# Patient Record
Sex: FEMALE | Race: WHITE | NOT HISPANIC OR LATINO | Employment: OTHER | URBAN - METROPOLITAN AREA
[De-identification: names, ages, dates, MRNs, and addresses within clinical notes are randomized per-mention and may not be internally consistent; named-entity substitution may affect disease eponyms.]

---

## 2023-03-24 PROBLEM — R32 URINARY INCONTINENCE: Status: ACTIVE | Noted: 2023-03-24

## 2023-03-24 PROBLEM — G47.33 OSA ON CPAP: Status: ACTIVE | Noted: 2023-03-24

## 2023-03-24 PROBLEM — K21.9 GASTROESOPHAGEAL REFLUX DISEASE WITHOUT ESOPHAGITIS: Status: ACTIVE | Noted: 2023-03-24

## 2023-03-24 PROBLEM — H91.90 HEARING LOSS: Status: ACTIVE | Noted: 2023-03-24

## 2023-03-24 PROBLEM — E55.9 VITAMIN D DEFICIENCY: Status: ACTIVE | Noted: 2023-03-24

## 2023-03-24 PROBLEM — C50.919 BREAST CANCER (MULTI): Status: ACTIVE | Noted: 2023-03-24

## 2023-03-24 PROBLEM — I10 HYPERTENSION: Status: ACTIVE | Noted: 2023-03-24

## 2023-03-24 PROBLEM — E78.5 HYPERLIPIDEMIA: Status: ACTIVE | Noted: 2023-03-24

## 2023-03-24 PROBLEM — H61.23 BILATERAL IMPACTED CERUMEN: Status: ACTIVE | Noted: 2023-03-24

## 2023-03-24 PROBLEM — R09.A2 GLOBUS SENSATION: Status: ACTIVE | Noted: 2023-03-24

## 2023-03-24 PROBLEM — M25.562 LEFT KNEE PAIN: Status: ACTIVE | Noted: 2023-03-24

## 2023-03-24 PROBLEM — I63.9 CVA (CEREBRAL VASCULAR ACCIDENT) (MULTI): Status: ACTIVE | Noted: 2023-03-24

## 2023-03-24 PROBLEM — K59.00 CONSTIPATION: Status: ACTIVE | Noted: 2023-03-24

## 2023-03-24 PROBLEM — R73.01 ELEVATED FASTING BLOOD SUGAR: Status: ACTIVE | Noted: 2023-03-24

## 2023-03-24 PROBLEM — J02.9 PHARYNGITIS: Status: ACTIVE | Noted: 2023-03-24

## 2023-03-24 PROBLEM — J01.10 ACUTE FRONTAL SINUSITIS: Status: ACTIVE | Noted: 2023-03-24

## 2023-03-24 PROBLEM — R09.82 POST-NASAL DRIP: Status: ACTIVE | Noted: 2023-03-24

## 2023-03-24 PROBLEM — F32.A DEPRESSION: Status: ACTIVE | Noted: 2023-03-24

## 2023-03-24 PROBLEM — M54.50 ACUTE LOW BACK PAIN: Status: ACTIVE | Noted: 2023-03-24

## 2023-03-24 PROBLEM — E04.1 THYROID NODULE: Status: ACTIVE | Noted: 2023-03-24
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
## 2023-03-24 PROBLEM — E11.9 DIABETES MELLITUS (MULTI): Status: ACTIVE | Noted: 2023-03-24

## 2023-03-24 PROBLEM — Z95.0 PACEMAKER: Status: ACTIVE | Noted: 2023-03-24

## 2023-03-24 PROBLEM — R13.14 PHARYNGOESOPHAGEAL DYSPHAGIA: Status: ACTIVE | Noted: 2023-03-24

## 2023-03-24 PROBLEM — R42 VERTIGO: Status: ACTIVE | Noted: 2023-03-24

## 2023-03-24 PROBLEM — R59.0 CERVICAL ADENOPATHY: Status: ACTIVE | Noted: 2023-03-24

## 2023-03-24 PROBLEM — I95.9 HYPOTENSION: Status: ACTIVE | Noted: 2023-03-24

## 2023-03-24 PROBLEM — M46.20 OSTEOMYELITIS OF SPINE (MULTI): Status: ACTIVE | Noted: 2023-03-24

## 2023-04-03 ENCOUNTER — TELEMEDICINE (OUTPATIENT)
Dept: PHARMACY | Facility: HOSPITAL | Age: 68
End: 2023-04-03
Payer: MEDICARE

## 2023-04-03 DIAGNOSIS — Z79.4 TYPE 2 DIABETES MELLITUS WITHOUT COMPLICATION, WITH LONG-TERM CURRENT USE OF INSULIN (MULTI): Primary | ICD-10-CM

## 2023-04-03 DIAGNOSIS — E11.9 TYPE 2 DIABETES MELLITUS WITHOUT COMPLICATION, WITH LONG-TERM CURRENT USE OF INSULIN (MULTI): Primary | ICD-10-CM

## 2023-04-03 DIAGNOSIS — F32.A DEPRESSION, UNSPECIFIED DEPRESSION TYPE: ICD-10-CM

## 2023-04-03 DIAGNOSIS — Z79.4 TYPE 2 DIABETES MELLITUS WITHOUT COMPLICATION, WITH LONG-TERM CURRENT USE OF INSULIN (MULTI): ICD-10-CM

## 2023-04-03 DIAGNOSIS — E11.9 TYPE 2 DIABETES MELLITUS WITHOUT COMPLICATION, WITH LONG-TERM CURRENT USE OF INSULIN (MULTI): ICD-10-CM

## 2023-04-03 DIAGNOSIS — I10 HYPERTENSION, UNSPECIFIED TYPE: Primary | ICD-10-CM

## 2023-04-03 RX ORDER — BUPROPION HYDROCHLORIDE 450 MG/1
450 TABLET, FILM COATED, EXTENDED RELEASE ORAL EVERY MORNING
Qty: 90 TABLET | Refills: 0
Start: 2023-04-03 | End: 2023-04-12 | Stop reason: SDUPTHER

## 2023-04-03 RX ORDER — AMLODIPINE BESYLATE 5 MG/1
5 TABLET ORAL DAILY
Qty: 90 TABLET | Refills: 0 | Status: SHIPPED | OUTPATIENT
Start: 2023-04-03 | End: 2023-06-29

## 2023-04-03 RX ORDER — ATORVASTATIN CALCIUM 40 MG/1
40 TABLET, FILM COATED ORAL NIGHTLY
Qty: 90 TABLET | Refills: 1 | Status: SHIPPED | OUTPATIENT
Start: 2023-04-03 | End: 2023-08-25 | Stop reason: SDUPTHER

## 2023-04-03 ASSESSMENT — ENCOUNTER SYMPTOMS
CONFUSION: 1
DEPRESSION: 1

## 2023-04-03 NOTE — PROGRESS NOTES
Subjective   Patient ID: Glenna Delgado is a 67 y.o. female who presents for Depression and Diabetes.    Referring Provider: Marleen Llanes,      Depression  Visit Type: follow-up  Patient presents with the following symptoms: confusion.   Severity: moderate   Compliance with medications: unsure compliance, pt mentions needing refill on 300 mg bottle despite last visit pt saying she was taking 450 mg daily with bottle in hand.    Diabetes  She presents for her follow-up diabetic visit. She has type 2 diabetes mellitus. Her disease course has been fluctuating. Hypoglycemia symptoms include confusion. Hypoglycemia complications include nocturnal hypoglycemia. Risk factors for coronary artery disease include dyslipidemia, diabetes mellitus, hypertension, stress and post-menopausal. Current diabetic treatments: Lantus 23 units daily + Humalog 12 units qAM + 6 units qPM, jardiance 25 mg daily, metformin 2g/day+ She is compliant with treatment most of the time. Her home blood glucose trend is fluctuating minimally (wakes up with BG in the 60s-70s at time). An ACE inhibitor/angiotensin II receptor blocker is being taken.       Review of Systems   Psychiatric/Behavioral:  Positive for confusion and depression.      Patient referred to clinical pharmacy for assistance with diabetes management. A1c continues to remain within goal range evidence by result of 5.5% on 12/1/22. Over last summer the patient reported getting hypoglycemia frequently in the 60s because of improper insulin administration, taking basal Lantus twice daily. She was then instructed to change basal insulin to once daily. She has continued to take Lantus 23u qAM + Humalog 12u qAM + 6u qPM. She mentions FBG today is still in the early morning 60-70, previously was . She acknowledges today she needs refills on amlodipine 5 mg daily, bupropion and atorvastatin 40 mg daily. She is asking about the bupropion 300 mg refill when she reported at last visit  taking 450 mg daily.     Objective     There were no vitals taken for this visit.     Labs  Lab Results   Component Value Date    BILITOT 0.3 12/02/2021    CALCIUM 9.8 12/02/2021    CO2 27 12/02/2021     12/02/2021    CREATININE 0.91 12/02/2021    GLUCOSE 183 (H) 12/02/2021    ALKPHOS 129 12/02/2021    K 4.4 12/02/2021    PROT 6.8 12/02/2021     12/02/2021    AST 10 12/02/2021    ALT 11 12/02/2021    BUN 19 12/02/2021    ANIONGAP 14 12/02/2021    ALBUMIN 4.0 12/02/2021     Lab Results   Component Value Date    TRIG 205 (H) 12/02/2021    CHOL 124 12/02/2021    HDL 35.4 (A) 12/02/2021     Lab Results   Component Value Date    HGBA1C 6.7 (A) 12/02/2021     Current Outpatient Medications on File Prior to Visit   Medication Sig Dispense Refill    amLODIPine (Norvasc) 5 mg tablet Take 1 tablet (5 mg) by mouth once daily.      atorvastatin (Lipitor) 40 mg tablet Take 1 tablet (40 mg) by mouth once daily at bedtime.      buPROPion XL (Wellbutrin XL) 300 mg 24 hr tablet TAKE ONE TABLET BY MOUTH EVERY DAY AS DIRECTED (Patient taking differently: 1 tablet (300 mg).) 90 tablet 0    clopidogrel (Plavix) 75 mg tablet TAKE 1 TABLET BY MOUTH EVERY DAY 90 tablet 0    clopidogrel (Plavix) 75 mg tablet Take 1 tablet (75 mg) by mouth once daily.      DULoxetine (Cymbalta) 60 mg DR capsule Take 1 capsule (60 mg) by mouth once daily.      glucose 4 gram chewable tablet Chew 4 tablets (16 g) if needed.      insulin lispro (HumaLOG) 100 unit/mL injection INJECT 12 UNITS SUBCUTANEOUSLY DAILY WITH BREAKFAST AND 9 UNITS DAILY WITH DINNER      Jardiance 25 mg Take 1 tablet (25 mg) by mouth once daily.      Lantus Solostar U-100 Insulin 100 unit/mL (3 mL) pen Inject 28 Units under the skin once daily at bedtime.      lisinopril 40 mg tablet Take 1 tablet (40 mg) by mouth once daily. as directed      metFORMIN (Glucophage) 500 mg tablet Take 2 tablets (1,000 mg) by mouth in the morning and 2 tablets (1,000 mg) in the evening. Take  with meals.      OneTouch Delica Plus Lancet 33 gauge misc USE AS DIRECTED TO TEST BLOOD SUGARS UP TO 5 TIMES DAILY      OneTouch Verio test strips strip USE TO CHECK BLOOD SUGER UP TO 5 TIMES DAILY AS DIRECTED      oxybutynin XL (Ditropan-XL) 10 mg 24 hr tablet Take 1 tablet (10 mg) by mouth once daily at bedtime.       No current facility-administered medications on file prior to visit.         Assessment/Plan   Problem List Items Addressed This Visit          Endocrine/Metabolic    Diabetes mellitus (CMS/HCC)       Other    Depression     Instructed patient to contact  pharmacy to refill correct 450 mg daily bupropion XL  Decrease Lantus to 20 units once daily   Refills for atorvastatin 40 mg and amlodipine 5 mg daily sent to   Continue all other meds as prescribed  Pharm follow up in 4 weeks    Thanks,  Melvin Flores, PharmD

## 2023-04-12 ENCOUNTER — TELEPHONE (OUTPATIENT)
Dept: PHARMACY | Facility: HOSPITAL | Age: 68
End: 2023-04-12
Payer: MEDICARE

## 2023-04-12 DIAGNOSIS — Z79.4 TYPE 2 DIABETES MELLITUS WITHOUT COMPLICATION, WITH LONG-TERM CURRENT USE OF INSULIN (MULTI): Primary | ICD-10-CM

## 2023-04-12 DIAGNOSIS — E11.9 TYPE 2 DIABETES MELLITUS WITHOUT COMPLICATION, WITH LONG-TERM CURRENT USE OF INSULIN (MULTI): Primary | ICD-10-CM

## 2023-04-12 DIAGNOSIS — F32.A DEPRESSION, UNSPECIFIED DEPRESSION TYPE: ICD-10-CM

## 2023-04-12 RX ORDER — BUPROPION HYDROCHLORIDE 450 MG/1
450 TABLET, FILM COATED, EXTENDED RELEASE ORAL EVERY MORNING
Qty: 90 TABLET | Refills: 0 | Status: SHIPPED | OUTPATIENT
Start: 2023-04-12 | End: 2023-06-02

## 2023-04-12 RX ORDER — LISINOPRIL 40 MG/1
40 TABLET ORAL DAILY
Qty: 90 TABLET | Refills: 0 | Status: SHIPPED | OUTPATIENT
Start: 2023-04-12 | End: 2023-07-05 | Stop reason: SDUPTHER

## 2023-05-02 NOTE — PROGRESS NOTES
Patient left voicemail to request refill for oxybutynin. Called patient back to request she comes into the office to see PCP for appointment for any further refills.    Thanks,  Melvin Flores, PharmD

## 2023-05-19 ENCOUNTER — TELEPHONE (OUTPATIENT)
Dept: PRIMARY CARE | Facility: CLINIC | Age: 68
End: 2023-05-19
Payer: MEDICARE

## 2023-05-19 NOTE — TELEPHONE ENCOUNTER
Pt requesting a refill of the following medications:    1). One Touch Delica Plus Lancet 30 ian (per pt 30 ian)      Katie Barba # [phone]

## 2023-05-23 DIAGNOSIS — F32.9 REACTIVE DEPRESSION: Primary | ICD-10-CM

## 2023-05-23 RX ORDER — DULOXETIN HYDROCHLORIDE 60 MG/1
CAPSULE, DELAYED RELEASE ORAL
Qty: 90 CAPSULE | Refills: 0 | Status: SHIPPED | OUTPATIENT
Start: 2023-05-23 | End: 2023-08-25 | Stop reason: SDUPTHER

## 2023-05-25 RX ORDER — LANCETS 33 GAUGE
EACH MISCELLANEOUS
Qty: 100 EACH | Refills: 3 | Status: CANCELLED | OUTPATIENT
Start: 2023-05-25

## 2023-05-26 ENCOUNTER — APPOINTMENT (OUTPATIENT)
Dept: PRIMARY CARE | Facility: CLINIC | Age: 68
End: 2023-05-26
Payer: MEDICARE

## 2023-06-02 ENCOUNTER — OFFICE VISIT (OUTPATIENT)
Dept: PRIMARY CARE | Facility: CLINIC | Age: 68
End: 2023-06-02
Payer: MEDICARE

## 2023-06-02 ENCOUNTER — TELEPHONE (OUTPATIENT)
Dept: PHARMACY | Facility: HOSPITAL | Age: 68
End: 2023-06-02

## 2023-06-02 VITALS — DIASTOLIC BLOOD PRESSURE: 70 MMHG | HEART RATE: 97 BPM | SYSTOLIC BLOOD PRESSURE: 118 MMHG

## 2023-06-02 DIAGNOSIS — Z79.4 TYPE 2 DIABETES MELLITUS WITH OTHER CIRCULATORY COMPLICATION, WITH LONG-TERM CURRENT USE OF INSULIN (MULTI): ICD-10-CM

## 2023-06-02 DIAGNOSIS — I10 PRIMARY HYPERTENSION: Primary | ICD-10-CM

## 2023-06-02 DIAGNOSIS — I63.9 CEREBROVASCULAR ACCIDENT (CVA), UNSPECIFIED MECHANISM (MULTI): ICD-10-CM

## 2023-06-02 DIAGNOSIS — E11.59 TYPE 2 DIABETES MELLITUS WITH OTHER CIRCULATORY COMPLICATION, WITH LONG-TERM CURRENT USE OF INSULIN (MULTI): ICD-10-CM

## 2023-06-02 DIAGNOSIS — F32.A DEPRESSION, UNSPECIFIED DEPRESSION TYPE: Primary | ICD-10-CM

## 2023-06-02 PROBLEM — I47.29 NSVT (NONSUSTAINED VENTRICULAR TACHYCARDIA) (MULTI): Status: ACTIVE | Noted: 2021-01-06

## 2023-06-02 PROBLEM — I44.2 COMPLETE HEART BLOCK (MULTI): Status: ACTIVE | Noted: 2021-01-06

## 2023-06-02 PROBLEM — Z96.651 HISTORY OF TOTAL RIGHT KNEE REPLACEMENT: Status: ACTIVE | Noted: 2020-10-05

## 2023-06-02 PROBLEM — E66.9 OBESITY, CLASS II, BMI 35-39.9: Status: ACTIVE | Noted: 2020-10-05

## 2023-06-02 PROBLEM — Z86.73 HISTORY OF STROKE: Status: ACTIVE | Noted: 2023-06-02

## 2023-06-02 PROBLEM — Z85.3 HISTORY OF BREAST CANCER: Status: ACTIVE | Noted: 2023-06-02

## 2023-06-02 PROBLEM — E66.812 OBESITY, CLASS II, BMI 35-39.9: Status: ACTIVE | Noted: 2020-10-05

## 2023-06-02 PROBLEM — Z95.0 S/P PLACEMENT OF CARDIAC PACEMAKER: Status: ACTIVE | Noted: 2020-10-06

## 2023-06-02 PROBLEM — F40.243 FEAR OF FLYING: Status: ACTIVE | Noted: 2023-06-02

## 2023-06-02 PROBLEM — F41.9 ANXIETY: Status: ACTIVE | Noted: 2023-06-02

## 2023-06-02 PROBLEM — Z86.018 HISTORY OF UTERINE FIBROID: Status: ACTIVE | Noted: 2023-06-02

## 2023-06-02 PROCEDURE — 3074F SYST BP LT 130 MM HG: CPT | Performed by: FAMILY MEDICINE

## 2023-06-02 PROCEDURE — 1036F TOBACCO NON-USER: CPT | Performed by: FAMILY MEDICINE

## 2023-06-02 PROCEDURE — 3078F DIAST BP <80 MM HG: CPT | Performed by: FAMILY MEDICINE

## 2023-06-02 PROCEDURE — 1159F MED LIST DOCD IN RCRD: CPT | Performed by: FAMILY MEDICINE

## 2023-06-02 PROCEDURE — 99214 OFFICE O/P EST MOD 30 MIN: CPT | Performed by: FAMILY MEDICINE

## 2023-06-02 PROCEDURE — 4010F ACE/ARB THERAPY RXD/TAKEN: CPT | Performed by: FAMILY MEDICINE

## 2023-06-02 RX ORDER — BUPROPION HYDROCHLORIDE 150 MG/1
150 TABLET, EXTENDED RELEASE ORAL DAILY
Qty: 30 TABLET | Refills: 5 | Status: SHIPPED | OUTPATIENT
Start: 2023-06-02 | End: 2023-11-29

## 2023-06-02 RX ORDER — PEN NEEDLE, DIABETIC 32GX 5/32"
NEEDLE, DISPOSABLE MISCELLANEOUS
COMMUNITY
Start: 2023-03-20 | End: 2024-01-17 | Stop reason: SDUPTHER

## 2023-06-02 RX ORDER — BUPROPION HYDROCHLORIDE 300 MG/1
300 TABLET ORAL EVERY MORNING
Qty: 30 TABLET | Refills: 5 | Status: SHIPPED | OUTPATIENT
Start: 2023-06-02 | End: 2023-08-11 | Stop reason: SDUPTHER

## 2023-06-02 RX ORDER — METOCLOPRAMIDE 5 MG/1
5 TABLET ORAL 4 TIMES DAILY
COMMUNITY
End: 2023-07-18

## 2023-06-02 ASSESSMENT — ENCOUNTER SYMPTOMS
FATIGUE: 0
ACTIVITY CHANGE: 0
FEVER: 0
COLOR CHANGE: 0
ARTHRALGIAS: 0
BACK PAIN: 0
COUGH: 0
APPETITE CHANGE: 0
FACIAL ASYMMETRY: 0
HEADACHES: 0
LIGHT-HEADEDNESS: 0
PALPITATIONS: 0
DIZZINESS: 0
CHOKING: 0
CHEST TIGHTNESS: 0

## 2023-06-02 NOTE — TELEPHONE ENCOUNTER
I reviewed the progress note and agree with the resident’s findings and plans as written. Case discussed with resident.    Melvin Flores, PharmD

## 2023-06-02 NOTE — PROGRESS NOTES
Subjective   Patient ID: Glenna Delgado is a 67 y.o. female who presents for Med Refill.    Med Refill  Pertinent negatives include no arthralgias, chest pain, congestion, coughing, fatigue, fever or headaches.      Patient comes in for refills of her diabetic medications.  She is currently following with pharmacy and doing quite well.  Denies any chest pain shortness of breath syncopal episodes or palpitations.    Review of Systems   Constitutional:  Negative for activity change, appetite change, fatigue and fever.   HENT:  Negative for congestion.    Respiratory:  Negative for cough, choking and chest tightness.    Cardiovascular:  Negative for chest pain, palpitations and leg swelling.   Musculoskeletal:  Negative for arthralgias, back pain and gait problem.   Skin:  Negative for color change and pallor.   Neurological:  Negative for dizziness, facial asymmetry, light-headedness and headaches.       Objective   /70 (BP Location: Left arm)   Pulse 97   BSA There is no height or weight on file to calculate BSA.      Physical Exam  Constitutional:       General: She is not in acute distress.     Appearance: Normal appearance. She is not toxic-appearing.   HENT:      Head: Normocephalic.      Right Ear: Tympanic membrane, ear canal and external ear normal.      Left Ear: Tympanic membrane, ear canal and external ear normal.      Nose: Nose normal.      Mouth/Throat:      Pharynx: Oropharynx is clear.   Eyes:      Conjunctiva/sclera: Conjunctivae normal.      Pupils: Pupils are equal, round, and reactive to light.   Cardiovascular:      Rate and Rhythm: Normal rate and regular rhythm.      Pulses: Normal pulses.      Heart sounds: Normal heart sounds.   Pulmonary:      Effort: No respiratory distress.      Breath sounds: No wheezing, rhonchi or rales.   Abdominal:      General: Bowel sounds are normal. There is no distension.      Palpations: Abdomen is soft.      Tenderness: There is no abdominal tenderness.    Musculoskeletal:         General: No swelling or tenderness.      Cervical back: No tenderness.   Skin:     Findings: No lesion or rash.   Neurological:      General: No focal deficit present.      Mental Status: She is alert and oriented to person, place, and time. Mental status is at baseline.      Gait: Gait normal.   Psychiatric:         Mood and Affect: Mood normal.         Behavior: Behavior normal.         Thought Content: Thought content normal.         Judgment: Judgment normal.       No visits with results within 1 Year(s) from this visit.   Latest known visit with results is:   Legacy Encounter on 12/02/2021   Component Date Value Ref Range Status    Glucose 12/02/2021 183 (H)  74 - 99 mg/dL Final    Sodium 12/02/2021 138  136 - 145 mmol/L Final    Potassium 12/02/2021 4.4  3.5 - 5.3 mmol/L Final    Chloride 12/02/2021 101  98 - 107 mmol/L Final    Bicarbonate 12/02/2021 27  21 - 32 mmol/L Final    Anion Gap 12/02/2021 14  10 - 20 mmol/L Final    Urea Nitrogen 12/02/2021 19  6 - 23 mg/dL Final    Creatinine 12/02/2021 0.91  0.50 - 1.05 mg/dL Final    GLOMERULAR FILTRATION RATE-NON AFR* 12/02/2021 >60  >60 mL/min/1.73m2 Final    GLOMERULAR FILTRATION RATE-* 12/02/2021 >60  >60 mL/min/1.73m2 Final    Comment:  CALCULATIONS OF ESTIMATED GFR ARE PERFORMED   USING THE MDRD STUDY EQUATION FOR THE   IDMS-TRACEABLE CREATININE METHODS.   CLIN CHEM 2007;53:766-72      Calcium 12/02/2021 9.8  8.6 - 10.6 mg/dL Final    Albumin 12/02/2021 4.0  3.4 - 5.0 g/dL Final    Alkaline Phosphatase 12/02/2021 129  33 - 136 U/L Final    Total Protein 12/02/2021 6.8  6.4 - 8.2 g/dL Final    AST 12/02/2021 10  9 - 39 U/L Final    Total Bilirubin 12/02/2021 0.3  0.0 - 1.2 mg/dL Final    ALT (SGPT) 12/02/2021 11  7 - 45 U/L Final    Comment:  Patients treated with Sulfasalazine may generate    falsely decreased results for ALT.      Cholesterol 12/02/2021 124  0 - 199 mg/dL Final    Comment: .      AGE      DESIRABLE    BORDERLINE HIGH   HIGH     0-19 Y     0 - 169       170 - 199     >/= 200    20-24 Y     0 - 189       190 - 224     >/= 225         >24 Y     0 - 199       200 - 239     >/= 240   **All ranges are based on fasting samples. Specific   therapeutic targets will vary based on patient-specific   cardiac risk.  .   Pediatric guidelines reference:Pediatrics 2011, 128(S5).   Adult guidelines reference: NCEP ATPIII Guidelines,     KAREEM 2001, 258:2486-97  .   Venipuncture immediately after or during the    administration of Metamizole may lead to falsely   low results. Testing should be performed immediately   prior to Metamizole dosing.      HDL 12/02/2021 35.4 (A)  mg/dL Final    Comment: .      AGE      VERY LOW   LOW     NORMAL    HIGH       0-19 Y       < 35   < 40     40-45     ----    20-24 Y       ----   < 40       >45     ----      >24 Y       ----   < 40     40-60      >60  .      Cholesterol/HDL Ratio 12/02/2021 3.5   Final    Comment: REF VALUES  DESIRABLE  < 3.4  HIGH RISK  > 5.0      LDL 12/02/2021 48  0 - 99 mg/dL Final    Comment: .                           NEAR      BORD      AGE      DESIRABLE  OPTIMAL    HIGH     HIGH     VERY HIGH     0-19 Y     0 - 109     ---    110-129   >/= 130     ----    20-24 Y     0 - 119     ---    120-159   >/= 160     ----      >24 Y     0 -  99   100-129  130-159   160-189     >/=190  .      VLDL 12/02/2021 41 (H)  0 - 40 mg/dL Final    Triglycerides 12/02/2021 205 (H)  0 - 149 mg/dL Final    Comment: .      AGE      DESIRABLE   BORDERLINE HIGH   HIGH     VERY HIGH   0 D-90 D    19 - 174         ----         ----        ----  91 D- 9 Y     0 -  74        75 -  99     >/= 100      ----    10-19 Y     0 -  89        90 - 129     >/= 130      ----    20-24 Y     0 - 114       115 - 149     >/= 150      ----         >24 Y     0 - 149       150 - 199    200- 499    >/= 500  .   Venipuncture immediately after or during the    administration of Metamizole may lead to falsely   low  "results. Testing should be performed immediately   prior to Metamizole dosing.      Non HDL Cholesterol 12/02/2021 89  mg/dL Final    Comment:     AGE      DESIRABLE   BORDERLINE HIGH   HIGH     VERY HIGH     0-19 Y     0 - 119       120 - 144     >/= 145    >/= 160    20-24 Y     0 - 149       150 - 189     >/= 190      ----         >24 Y    30 MG/DL ABOVE LDL CHOLESTEROL GOAL  .      Hemoglobin A1C 12/02/2021 6.7 (A)  % Final    Comment:      Diagnosis of Diabetes-Adults   Non-Diabetic: < or = 5.6%   Increased risk for developing diabetes: 5.7-6.4%   Diagnostic of diabetes: > or = 6.5%  .       Monitoring of Diabetes                Age (y)     Therapeutic Goal (%)   Adults:          >18           <7.0   Pediatrics:    13-18           <7.5                   7-12           <8.0                   0- 6            7.5-8.5   American Diabetes Association. Diabetes Care 33(S1), Jan 2010.      Estimated Average Glucose 12/02/2021 146  MG/DL Final     Current Outpatient Medications on File Prior to Visit   Medication Sig Dispense Refill    amLODIPine (Norvasc) 5 mg tablet Take 1 tablet (5 mg) by mouth once daily. 90 tablet 0    atorvastatin (Lipitor) 40 mg tablet Take 1 tablet (40 mg) by mouth once daily at bedtime. 90 tablet 1    BD Conchita 2nd Gen Pen Needle 32 gauge x 5/32\" needle USE 5 A DAY WITH INSULIN INJECTIONS      buPROPion XL (Forfivo XL) 450 mg 24 hr tablet Take 1 tablet (450 mg) by mouth once daily in the morning. 90 tablet 0    clopidogrel (Plavix) 75 mg tablet TAKE 1 TABLET BY MOUTH EVERY DAY 90 tablet 0    clopidogrel (Plavix) 75 mg tablet Take 1 tablet (75 mg) by mouth once daily.      DULoxetine (Cymbalta) 60 mg DR capsule TAKE ONE CAPSULE BY MOUTH EVERY DAY 90 capsule 0    empagliflozin (Jardiance) 25 mg Take 1 tablet (25 mg) by mouth once daily. 90 tablet 0    glucose 4 gram chewable tablet Chew 4 tablets (16 g) if needed.      insulin lispro (HumaLOG) 100 unit/mL injection INJECT 12 UNITS SUBCUTANEOUSLY " DAILY WITH BREAKFAST AND 9 UNITS DAILY WITH DINNER      Lantus Solostar U-100 Insulin 100 unit/mL (3 mL) pen Inject 20 Units under the skin once daily at bedtime.      lisinopril 40 mg tablet Take 1 tablet (40 mg) by mouth once daily. as directed 90 tablet 0    metFORMIN (Glucophage) 500 mg tablet Take 2 tablets (1,000 mg) by mouth 2 times a day with meals.      metoclopramide (Reglan) 5 mg tablet Take 1 tablet (5 mg) by mouth 4 times a day.      OneTouch Delica Plus Lancet 33 gauge misc USE AS DIRECTED TO TEST BLOOD SUGARS UP TO 5 TIMES DAILY      OneTouch Verio test strips strip USE TO CHECK BLOOD SUGER UP TO 5 TIMES DAILY AS DIRECTED      oxybutynin XL (Ditropan-XL) 10 mg 24 hr tablet TAKE ONE TABLET BY MOUTH EVERY DAY AT BEDTIME 90 tablet 0     No current facility-administered medications on file prior to visit.     No images are attached to the encounter.            Assessment/Plan   Diagnoses and all orders for this visit:  Primary hypertension  Cerebrovascular accident (CVA), unspecified mechanism (CMS/HCC)  Type 2 diabetes mellitus with other circulatory complication, with long-term current use of insulin (CMS/HCC)

## 2023-06-02 NOTE — TELEPHONE ENCOUNTER
Reason for Call:  Called Patient's [facility] pharmacy to figure out issue with Welbutrin rx    Assessment:  Spoke with pharmacit, patient's insurance does not cover Welbutrin 450 mg ER tablets. Patient was instructed to call our office to come up with solution but never did. Will send script for 300 mg ER tablet and 150 mg ER tablet for total dose of 450 mg.     Adendum:  Patient also needs script for lancets/test strips. Will send.    Plan:   Send Welbutrin 300 mg ER tablet and Welbutrin 150 mg ER tablet scripts to [facility]  Send script for lancets/test strips.  Continue all meds under the continuation of care with the referring provider and clinical pharmacy team  Follow up with patient in 1 week to ensure she received her rx and is taking her new dose    Saleem Genao, PharmD  PGY-1 Pharmacy Resident  [email or web address]   P: [phone]

## 2023-06-05 RX ORDER — BLOOD-GLUCOSE METER
1 EACH MISCELLANEOUS 3 TIMES DAILY
Qty: 100 STRIP | Refills: 11 | Status: SHIPPED | OUTPATIENT
Start: 2023-06-05 | End: 2024-06-04

## 2023-06-05 RX ORDER — LANCETS 33 GAUGE
1 EACH MISCELLANEOUS 3 TIMES DAILY
Qty: 100 EACH | Refills: 11 | Status: SHIPPED | OUTPATIENT
Start: 2023-06-05 | End: 2024-06-04

## 2023-06-06 RX ORDER — LANCETS 33 GAUGE
EACH MISCELLANEOUS
Qty: 100 EACH | Refills: 0 | Status: SHIPPED | OUTPATIENT
Start: 2023-06-06

## 2023-06-12 DIAGNOSIS — Z79.4 TYPE 2 DIABETES MELLITUS WITH OTHER CIRCULATORY COMPLICATION, WITH LONG-TERM CURRENT USE OF INSULIN (MULTI): Primary | ICD-10-CM

## 2023-06-12 DIAGNOSIS — E11.59 TYPE 2 DIABETES MELLITUS WITH OTHER CIRCULATORY COMPLICATION, WITH LONG-TERM CURRENT USE OF INSULIN (MULTI): Primary | ICD-10-CM

## 2023-06-12 RX ORDER — INSULIN GLARGINE 100 [IU]/ML
20 INJECTION, SOLUTION SUBCUTANEOUS NIGHTLY
Qty: 30 ML | Refills: 1 | Status: SHIPPED | OUTPATIENT
Start: 2023-06-12 | End: 2023-12-12 | Stop reason: SDUPTHER

## 2023-06-22 DIAGNOSIS — I63.9 CEREBROVASCULAR ACCIDENT (CVA), UNSPECIFIED MECHANISM (MULTI): ICD-10-CM

## 2023-06-22 RX ORDER — CLOPIDOGREL BISULFATE 75 MG/1
TABLET ORAL
Qty: 90 TABLET | Refills: 0 | Status: SHIPPED | OUTPATIENT
Start: 2023-06-22 | End: 2023-08-11 | Stop reason: SDUPTHER

## 2023-06-29 ENCOUNTER — TELEPHONE (OUTPATIENT)
Dept: PRIMARY CARE | Facility: CLINIC | Age: 68
End: 2023-06-29
Payer: MEDICARE

## 2023-06-29 DIAGNOSIS — I10 HYPERTENSION, UNSPECIFIED TYPE: ICD-10-CM

## 2023-06-29 RX ORDER — AMLODIPINE BESYLATE 5 MG/1
TABLET ORAL
Qty: 90 TABLET | Refills: 0 | Status: SHIPPED | OUTPATIENT
Start: 2023-06-29 | End: 2023-08-11 | Stop reason: ALTCHOICE

## 2023-06-29 NOTE — TELEPHONE ENCOUNTER
Please schedule patient for [facility] wellness visit after 12/7/2023.    Thank you-  Maurice Syed CMA  6/29/2023  Practice Supervisor  [facility]

## 2023-07-03 NOTE — TELEPHONE ENCOUNTER
Spoke with pt's  Alessandro and scheduled PE appt. For 12/12  BW    please mail lab order to home address. Pt uses an outside lab

## 2023-07-05 DIAGNOSIS — E11.9 TYPE 2 DIABETES MELLITUS WITHOUT COMPLICATION, WITH LONG-TERM CURRENT USE OF INSULIN (MULTI): ICD-10-CM

## 2023-07-05 DIAGNOSIS — Z79.4 TYPE 2 DIABETES MELLITUS WITHOUT COMPLICATION, WITH LONG-TERM CURRENT USE OF INSULIN (MULTI): ICD-10-CM

## 2023-07-05 RX ORDER — LISINOPRIL 40 MG/1
40 TABLET ORAL DAILY
Qty: 90 TABLET | Refills: 0 | Status: SHIPPED | OUTPATIENT
Start: 2023-07-05 | End: 2023-08-11 | Stop reason: SDUPTHER

## 2023-07-05 NOTE — TELECONSULT
Patient called to request refills for Jardiance 25 mg daily and lisinopril 40 mg daily. Both Rxs refilled 90D 0RF to [facility].    Melvin Flores, PharmD

## 2023-07-13 ENCOUNTER — PATIENT OUTREACH (OUTPATIENT)
Dept: CARE COORDINATION | Facility: CLINIC | Age: 68
End: 2023-07-13
Payer: MEDICARE

## 2023-07-13 ENCOUNTER — TELEPHONE (OUTPATIENT)
Dept: PRIMARY CARE | Facility: CLINIC | Age: 68
End: 2023-07-13
Payer: MEDICARE

## 2023-07-13 ENCOUNTER — DOCUMENTATION (OUTPATIENT)
Dept: CARE COORDINATION | Facility: CLINIC | Age: 68
End: 2023-07-13
Payer: MEDICARE

## 2023-07-13 DIAGNOSIS — R55 SYNCOPE, UNSPECIFIED SYNCOPE TYPE: ICD-10-CM

## 2023-07-13 NOTE — TELEPHONE ENCOUNTER
Patient was discharged from [facility] yesterday, Edita from [facility] would like to know if you would sign for orders.

## 2023-07-13 NOTE — PROGRESS NOTES
Discharge Facility:[facility] Diagnosis:Principal Problem: Syncope and collapse Active Problems: TBI (traumatic brain injury) ([facility]) History of breast cancer Hypertension Type 2 diabetes mellitus with hyperglycemia, without long-term current use of insulin (HCC) History of stroke S/P placement of cardiac pacemaker  Admission Date:7.9.23  Discharge Date: 7.12.23    PCP Appointment Date:7.18.23  Specialist Appointment Date: Date Time Provider Department Center 8/7/2023 To Be Determined REM DEVICE ROM[facility] CARTER [facility]   Hospital Encounter and Summary: Linked   See discharge assessment below for further details     Engagement  Call Start Time: 1351 (7/13/2023  1:51 PM)    Medications  Medications reviewed with patient/caregiver?: Yes (with spouse, med changes) (7/13/2023  1:51 PM)  Is the patient having any side effects they believe may be caused by any medication additions or changes?: No (7/13/2023  1:51 PM)  Does the patient have all medications ordered at discharge?: Yes (7/13/2023  1:51 PM)  Care Management Interventions: No intervention needed (7/13/2023  1:51 PM)  Is the patient taking all medications as directed (includes completed medication regime)?: Yes (7/13/2023  1:51 PM)  Care Management Interventions: Provided patient education (7/13/2023  1:51 PM)    Appointments  Does the patient have a primary care provider?: Yes (7/13/2023  1:51 PM)  Care Management Interventions: Verified appointment date/time/provider (7/13/2023  1:51 PM)  Has the patient kept scheduled appointments due by today?: Yes (7/13/2023  1:51 PM)  Care Management Interventions: Advised patient to keep appointment (7/13/2023  1:51 PM)    Self Management  [facility] (7/13/2023  1:51 PM)  Has home health visited the patient within 72 hours of discharge?: Yes (7/13/2023  1:51 PM)    Patient Teaching  Does the patient have access to their discharge instructions?: Yes (7/13/2023  1:51 PM)  Care Management  Interventions: Reviewed instructions with patient (7/13/2023  1:51 PM)  What is the patient's perception of their health status since discharge?: Improving (7/13/2023  1:51 PM)    Wrap Up  Wrap Up Additional Comments: Patient was napping. Spoke with spouse. No further dizzy or lightheaded moments. Was instructed to dc HCTZ. Noted in med list. DC instructions ask that PCP review Glyburide (7/13/2023  1:51 PM)

## 2023-07-14 ENCOUNTER — TELEPHONE (OUTPATIENT)
Dept: PRIMARY CARE | Facility: CLINIC | Age: 68
End: 2023-07-14
Payer: MEDICARE

## 2023-07-14 NOTE — TELEPHONE ENCOUNTER
I don't believe she has ever been referred to me. I can certainly discuss with her after I am back in the office on the 24th. I believe she has her TCM appt next week and you could introduce it and place the referral at that time. I will follow up with her upon my return.   Also, yes, please have Tyesha call [facility] and let them know you are willing to sign home care orders.

## 2023-07-14 NOTE — TELEPHONE ENCOUNTER
Xiomara nurse with [facility] wanted to let you know she completed her start of care today and there going to start home care services for her. Also requesting a verbal order for a social work to help assist with community resources. Wanting to know if you would sign the plan of care?       [phone]

## 2023-07-18 ENCOUNTER — APPOINTMENT (OUTPATIENT)
Dept: PRIMARY CARE | Facility: CLINIC | Age: 68
End: 2023-07-18
Payer: MEDICARE

## 2023-08-02 ENCOUNTER — TELEPHONE (OUTPATIENT)
Dept: PRIMARY CARE | Facility: CLINIC | Age: 68
End: 2023-08-02
Payer: MEDICARE

## 2023-08-07 ENCOUNTER — DOCUMENTATION (OUTPATIENT)
Dept: PRIMARY CARE | Facility: CLINIC | Age: 68
End: 2023-08-07
Payer: MEDICARE

## 2023-08-07 ENCOUNTER — PATIENT OUTREACH (OUTPATIENT)
Dept: CARDIOLOGY | Facility: CLINIC | Age: 68
End: 2023-08-07
Payer: MEDICARE

## 2023-08-07 NOTE — PROGRESS NOTES
Discharge Facility: [facility]  Discharge Diagnosis: multiple falls  Admission Date: 7/20/23  Discharge Date: 8/5/23    PCP Appointment Date: none   Specialist Appointment Date:   Hospital Encounter and Summary: not available at this time (pick one)  See discharge assessment below for further details     Engagement  Call Start Time: 1400 (8/7/2023  2:03 PM)    Medications  Medications reviewed with patient/caregiver?: Yes (8/7/2023  2:03 PM)  Is the patient having any side effects they believe may be caused by any medication additions or changes?: No (8/7/2023  2:03 PM)  Does the patient have all medications ordered at discharge?: Yes (8/7/2023  2:03 PM)    Appointments  Does the patient have a primary care provider?: Yes (8/7/2023  2:03 PM)    Patient Teaching  Does the patient have access to their discharge instructions?: Yes (8/7/2023  2:03 PM)    Wrap Up  Call End Time: 1406 (8/7/2023  2:03 PM)

## 2023-08-11 ENCOUNTER — PATIENT OUTREACH (OUTPATIENT)
Dept: PRIMARY CARE | Facility: CLINIC | Age: 68
End: 2023-08-11

## 2023-08-11 ENCOUNTER — TELEPHONE (OUTPATIENT)
Dept: PRIMARY CARE | Facility: CLINIC | Age: 68
End: 2023-08-11

## 2023-08-11 ENCOUNTER — OFFICE VISIT (OUTPATIENT)
Dept: PRIMARY CARE | Facility: CLINIC | Age: 68
End: 2023-08-11
Payer: MEDICARE

## 2023-08-11 VITALS — SYSTOLIC BLOOD PRESSURE: 109 MMHG | OXYGEN SATURATION: 98 % | HEART RATE: 76 BPM | DIASTOLIC BLOOD PRESSURE: 72 MMHG

## 2023-08-11 DIAGNOSIS — Z95.0 PACEMAKER: ICD-10-CM

## 2023-08-11 DIAGNOSIS — Z09 HOSPITAL DISCHARGE FOLLOW-UP: Primary | ICD-10-CM

## 2023-08-11 DIAGNOSIS — E11.9 TYPE 2 DIABETES MELLITUS WITHOUT COMPLICATION, WITH LONG-TERM CURRENT USE OF INSULIN (MULTI): ICD-10-CM

## 2023-08-11 DIAGNOSIS — I10 PRIMARY HYPERTENSION: ICD-10-CM

## 2023-08-11 DIAGNOSIS — E11.59 TYPE 2 DIABETES MELLITUS WITH OTHER CIRCULATORY COMPLICATION, WITH LONG-TERM CURRENT USE OF INSULIN (MULTI): Primary | ICD-10-CM

## 2023-08-11 DIAGNOSIS — Z79.4 TYPE 2 DIABETES MELLITUS WITH OTHER CIRCULATORY COMPLICATION, WITH LONG-TERM CURRENT USE OF INSULIN (MULTI): ICD-10-CM

## 2023-08-11 DIAGNOSIS — I44.2 COMPLETE HEART BLOCK (MULTI): ICD-10-CM

## 2023-08-11 DIAGNOSIS — E11.59 TYPE 2 DIABETES MELLITUS WITH OTHER CIRCULATORY COMPLICATION, WITH LONG-TERM CURRENT USE OF INSULIN (MULTI): ICD-10-CM

## 2023-08-11 DIAGNOSIS — Z79.4 TYPE 2 DIABETES MELLITUS WITH OTHER CIRCULATORY COMPLICATION, WITH LONG-TERM CURRENT USE OF INSULIN (MULTI): Primary | ICD-10-CM

## 2023-08-11 DIAGNOSIS — Z86.73 HISTORY OF CVA (CEREBROVASCULAR ACCIDENT): ICD-10-CM

## 2023-08-11 DIAGNOSIS — Z79.4 TYPE 2 DIABETES MELLITUS WITHOUT COMPLICATION, WITH LONG-TERM CURRENT USE OF INSULIN (MULTI): ICD-10-CM
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
## 2023-08-11 PROBLEM — I63.9 CVA (CEREBRAL VASCULAR ACCIDENT) (MULTI): Status: RESOLVED | Noted: 2023-03-24 | Resolved: 2023-08-11

## 2023-08-11 PROBLEM — J01.10 ACUTE FRONTAL SINUSITIS: Status: RESOLVED | Noted: 2023-03-24 | Resolved: 2023-08-11

## 2023-08-11 PROBLEM — K59.00 CONSTIPATION: Status: RESOLVED | Noted: 2023-03-24 | Resolved: 2023-08-11

## 2023-08-11 PROBLEM — R73.01 ELEVATED FASTING BLOOD SUGAR: Status: RESOLVED | Noted: 2023-03-24 | Resolved: 2023-08-11

## 2023-08-11 PROBLEM — R32 URINARY INCONTINENCE: Status: RESOLVED | Noted: 2023-03-24 | Resolved: 2023-08-11

## 2023-08-11 PROBLEM — I95.9 HYPOTENSION: Status: RESOLVED | Noted: 2023-03-24 | Resolved: 2023-08-11

## 2023-08-11 PROBLEM — R41.0 CONFUSION: Status: RESOLVED | Noted: 2023-07-19 | Resolved: 2023-08-11

## 2023-08-11 PROBLEM — R41.0 CONFUSION: Status: ACTIVE | Noted: 2023-07-19

## 2023-08-11 PROBLEM — R55 SYNCOPE AND COLLAPSE: Status: ACTIVE | Noted: 2023-07-09

## 2023-08-11 PROBLEM — M25.562 LEFT KNEE PAIN: Status: RESOLVED | Noted: 2023-03-24 | Resolved: 2023-08-11

## 2023-08-11 PROBLEM — M54.50 ACUTE LOW BACK PAIN: Status: RESOLVED | Noted: 2023-03-24 | Resolved: 2023-08-11

## 2023-08-11 PROBLEM — H61.23 BILATERAL IMPACTED CERUMEN: Status: RESOLVED | Noted: 2023-03-24 | Resolved: 2023-08-11

## 2023-08-11 PROCEDURE — 1160F RVW MEDS BY RX/DR IN RCRD: CPT | Performed by: NURSE PRACTITIONER

## 2023-08-11 PROCEDURE — 99496 TRANSJ CARE MGMT HIGH F2F 7D: CPT | Performed by: NURSE PRACTITIONER

## 2023-08-11 PROCEDURE — 4010F ACE/ARB THERAPY RXD/TAKEN: CPT | Performed by: NURSE PRACTITIONER

## 2023-08-11 PROCEDURE — 1036F TOBACCO NON-USER: CPT | Performed by: NURSE PRACTITIONER

## 2023-08-11 PROCEDURE — 1159F MED LIST DOCD IN RCRD: CPT | Performed by: NURSE PRACTITIONER

## 2023-08-11 PROCEDURE — 3074F SYST BP LT 130 MM HG: CPT | Performed by: NURSE PRACTITIONER

## 2023-08-11 PROCEDURE — 3078F DIAST BP <80 MM HG: CPT | Performed by: NURSE PRACTITIONER

## 2023-08-11 RX ORDER — METFORMIN HYDROCHLORIDE 500 MG/1
1000 TABLET ORAL 2 TIMES DAILY
Qty: 360 TABLET | Refills: 3 | Status: SHIPPED | OUTPATIENT
Start: 2023-08-11 | End: 2023-11-17 | Stop reason: SDUPTHER

## 2023-08-11 RX ORDER — LISINOPRIL 40 MG/1
40 TABLET ORAL DAILY
COMMUNITY
End: 2023-08-25 | Stop reason: SDUPTHER

## 2023-08-11 RX ORDER — HYDROCHLOROTHIAZIDE 25 MG/1
25 TABLET ORAL DAILY
COMMUNITY
End: 2023-08-11 | Stop reason: ALTCHOICE

## 2023-08-11 RX ORDER — BUPROPION HYDROCHLORIDE 300 MG/1
300 TABLET ORAL DAILY
COMMUNITY
End: 2023-08-25 | Stop reason: SDUPTHER

## 2023-08-11 ASSESSMENT — ENCOUNTER SYMPTOMS
FEVER: 0
SHORTNESS OF BREATH: 0
VOMITING: 0
CHILLS: 0
ABDOMINAL PAIN: 0
DIARRHEA: 0
COUGH: 0
NAUSEA: 0

## 2023-08-11 NOTE — PROGRESS NOTES
CCM referral made from [facility] after discussion with PCP Dr. Llanes for Type 2 DM, and HTN.   Patient introduced to [facility]   Patient opted into services on 8/11/2023  Services will be performed under the direction of PCP: Dr. Llanes  Patient has planned follow-up on 8/25/2023  I have discussed the nature and availability of the service with the patient, the patient's responsibility for potential cost sharing (I estimate $0/month based on [employer] PCP co-pay), only one practitioner furnishing services during a calendar month, and the right to stop services at any time.     Patient had a hospital follow up today with [facility]. Patient reportedly fell 3 times in a short period of time. This is possibly due to BP being low. Her BP meds were discontinued, but between home, to hospital to rehab and back home, she is basically back on all her pre-hospitalization meds, but doesn't know what they are. It is unknown if these falls are truly BP related or glucose related, or some other cause. The plan is for patient to be on Lisinopril only for her blood pressure and check this daily and keep a record of it. She is also going to keep close track of her glucose levels and keep record of it. We discussed what BP numbers are too high and what are too low. Same with glucose. If any problems over the weekend, they are to call Provider on call. If any problems during office hours they will let me know. Pharm D has historically worked with patient and has been updated. I plan to check in on them in 1 week for an update and readings. We sent patient and  home with a handwritten list of current medications she is to be on. She will come back in 2 weeks with PCP.

## 2023-08-11 NOTE — PATIENT INSTRUCTIONS
Start taking medication as prescribed  Stop taking hydrochlorothiazide and Amlodipine  Follow up with Dr. Llanes in 2 weeks   Please call us in 1 week with a log of your home blood pressure readings

## 2023-08-11 NOTE — PROGRESS NOTES
"Patient: Glenna Delgado  : 1955  PCP: Marleen Llanes DO  MRN: [id number]  Program: No linked episodes     Glenna Delgado is a 68 y.o. female presenting today for follow-up after being discharged from the hospital 6 days ago. The main problem requiring admission was fall. The discharge summary and/or Transitional Care Management documentation was reviewed. Medication reconciliation was performed as indicated via the \"Arun as Reviewed\" timestamp.     Glenna Delgado was contacted by Transitional Care Management services two days after her discharge. This encounter and supporting documentation was reviewed.    The complexity of medical decision making for this patient's transitional care is high.      Hospital Course: \"Patient is a 68-year-old female with past medical history of breast cancer, diabetes, hypertension, remote history of stroke with some speech difficulty and cognitive deficits presented to the ER on 2023 with chief complaint of fall at home. Patient denied loss of consciousness. Patient recently was admitted (2023) after she had a fall with syncopal episode. Work-up at that time was negative. Pacemaker interrogation revealed no arrhythmia but was noted to be positive for orthostatic hypotension.  Patient continues to complain of pain headache with some dizziness. She is seen by neurology and underwent an MRI of the brain that was negative for any acute issues. Recurrent falls are felt to be related to peripheral neuropathy.  Patient also had an echocardiogram that showed normal LVEF with no valvular abnormalities.  Patient tested negative for orthostatic hypotension at this time.  Continue to remain hemodynamically stable. No arrhythmia noted on pacemaker check.  Patient is evaluated by PT/OT who recommended skilled nursing facility at discharge.    Labs and Procedures Pending at Discharge: No pending results.    Consulting Teams During Hospitalization: Neurology: Dr. Gray " "    Patient Condition @ Discharge: Stable    Discharge Disposition: Skilled Nursing Facility \"    Patient presents today with her  for follow up visit after discharge from hospital and SNF.   She reports feeling overall well today. She believes her strength has increased since undergoing rehabilitation and strength training.   They presents with many questions about medications. Patient is unclear as to which medications she should be taking. She self continued the medications she was previously on when she came back home.   We will provide patient with an updated list of medications today     Patient is advised to stop hydrochlorothiazide and amlodipine and follow up with PCP in 2 weeks.  Patient is being referred to chronic care management.        Review of Systems   Constitutional:  Negative for chills and fever.   Respiratory:  Negative for cough and shortness of breath.    Cardiovascular:  Negative for chest pain.   Gastrointestinal:  Negative for abdominal pain, diarrhea, nausea and vomiting.       Family History   Problem Relation Name Age of Onset    Heart disease Father      Alzheimer's disease Father         Engagement  Call Start Time: 1400 (8/7/2023  2:03 PM)    Medications  Medications reviewed with patient/caregiver?: Yes (8/7/2023  2:03 PM)  Is the patient having any side effects they believe may be caused by any medication additions or changes?: No (8/7/2023  2:03 PM)  Does the patient have all medications ordered at discharge?: Yes (8/7/2023  2:03 PM)    Appointments  Does the patient have a primary care provider?: Yes (8/7/2023  2:03 PM)    Patient Teaching  Does the patient have access to their discharge instructions?: Yes (8/7/2023  2:03 PM)    Wrap Up  Call End Time: 1406 (8/7/2023  2:03 PM)        No follow-ups on file.    Objective   /72   Pulse 76   SpO2 98%   BSA There is no height or weight on file to calculate BSA.    Physical Exam  Constitutional:       Appearance: Normal " appearance.   Cardiovascular:      Rate and Rhythm: Normal rate and regular rhythm.   Pulmonary:      Effort: Pulmonary effort is normal.      Breath sounds: Normal breath sounds.   Abdominal:      General: Abdomen is flat.      Palpations: Abdomen is soft.   Neurological:      Mental Status: She is alert.       No visits with results within 1 Year(s) from this visit.   Latest known visit with results is:   Legacy Encounter on 12/02/2021   Component Date Value Ref Range Status    Glucose 12/02/2021 183 (H)  74 - 99 mg/dL Final    Sodium 12/02/2021 138  136 - 145 mmol/L Final    Potassium 12/02/2021 4.4  3.5 - 5.3 mmol/L Final    Chloride 12/02/2021 101  98 - 107 mmol/L Final    Bicarbonate 12/02/2021 27  21 - 32 mmol/L Final    Anion Gap 12/02/2021 14  10 - 20 mmol/L Final    Urea Nitrogen 12/02/2021 19  6 - 23 mg/dL Final    Creatinine 12/02/2021 0.91  0.50 - 1.05 mg/dL Final    GLOMERULAR FILTRATION RATE-NON AFR* 12/02/2021 >60  >60 mL/min/1.73m2 Final    GLOMERULAR FILTRATION RATE-* 12/02/2021 >60  >60 mL/min/1.73m2 Final    Comment:  CALCULATIONS OF ESTIMATED GFR ARE PERFORMED   USING THE MDRD STUDY EQUATION FOR THE   IDMS-TRACEABLE CREATININE METHODS.   CLIN CHEM 2007;53:766-72      Calcium 12/02/2021 9.8  8.6 - 10.6 mg/dL Final    Albumin 12/02/2021 4.0  3.4 - 5.0 g/dL Final    Alkaline Phosphatase 12/02/2021 129  33 - 136 U/L Final    Total Protein 12/02/2021 6.8  6.4 - 8.2 g/dL Final    AST 12/02/2021 10  9 - 39 U/L Final    Total Bilirubin 12/02/2021 0.3  0.0 - 1.2 mg/dL Final    ALT (SGPT) 12/02/2021 11  7 - 45 U/L Final    Comment:  Patients treated with Sulfasalazine may generate    falsely decreased results for ALT.      Cholesterol 12/02/2021 124  0 - 199 mg/dL Final    Comment: .      AGE      DESIRABLE   BORDERLINE HIGH   HIGH     0-19 Y     0 - 169       170 - 199     >/= 200    20-24 Y     0 - 189       190 - 224     >/= 225         >24 Y     0 - 199       200 - 239     >/= 240   **All  ranges are based on fasting samples. Specific   therapeutic targets will vary based on patient-specific   cardiac risk.  .   Pediatric guidelines reference:Pediatrics 2011, 128(S5).   Adult guidelines reference: NCEP ATPIII Guidelines,     KAREEM 2001, 258:2486-97  .   Venipuncture immediately after or during the    administration of Metamizole may lead to falsely   low results. Testing should be performed immediately   prior to Metamizole dosing.      HDL 12/02/2021 35.4 (A)  mg/dL Final    Comment: .      AGE      VERY LOW   LOW     NORMAL    HIGH       0-19 Y       < 35   < 40     40-45     ----    20-24 Y       ----   < 40       >45     ----      >24 Y       ----   < 40     40-60      >60  .      Cholesterol/HDL Ratio 12/02/2021 3.5   Final    Comment: REF VALUES  DESIRABLE  < 3.4  HIGH RISK  > 5.0      LDL 12/02/2021 48  0 - 99 mg/dL Final    Comment: .                           NEAR      BORD      AGE      DESIRABLE  OPTIMAL    HIGH     HIGH     VERY HIGH     0-19 Y     0 - 109     ---    110-129   >/= 130     ----    20-24 Y     0 - 119     ---    120-159   >/= 160     ----      >24 Y     0 -  99   100-129  130-159   160-189     >/=190  .      VLDL 12/02/2021 41 (H)  0 - 40 mg/dL Final    Triglycerides 12/02/2021 205 (H)  0 - 149 mg/dL Final    Comment: .      AGE      DESIRABLE   BORDERLINE HIGH   HIGH     VERY HIGH   0 D-90 D    19 - 174         ----         ----        ----  91 D- 9 Y     0 -  74        75 -  99     >/= 100      ----    10-19 Y     0 -  89        90 - 129     >/= 130      ----    20-24 Y     0 - 114       115 - 149     >/= 150      ----         >24 Y     0 - 149       150 - 199    200- 499    >/= 500  .   Venipuncture immediately after or during the    administration of Metamizole may lead to falsely   low results. Testing should be performed immediately   prior to Metamizole dosing.      Non HDL Cholesterol 12/02/2021 89  mg/dL Final    Comment:     AGE      DESIRABLE   BORDERLINE HIGH    "HIGH     VERY HIGH     0-19 Y     0 - 119       120 - 144     >/= 145    >/= 160    20-24 Y     0 - 149       150 - 189     >/= 190      ----         >24 Y    30 MG/DL ABOVE LDL CHOLESTEROL GOAL  .      Hemoglobin A1C 12/02/2021 6.7 (A)  % Final    Comment:      Diagnosis of Diabetes-Adults   Non-Diabetic: < or = 5.6%   Increased risk for developing diabetes: 5.7-6.4%   Diagnostic of diabetes: > or = 6.5%  .       Monitoring of Diabetes                Age (y)     Therapeutic Goal (%)   Adults:          >18           <7.0   Pediatrics:    13-18           <7.5                   7-12           <8.0                   0- 6            7.5-8.5   American Diabetes Association. Diabetes Care 33(S1), Jan 2010.      Estimated Average Glucose 12/02/2021 146  MG/DL Final     Current Outpatient Medications on File Prior to Visit   Medication Sig Dispense Refill    atorvastatin (Lipitor) 40 mg tablet Take 1 tablet (40 mg) by mouth once daily at bedtime. 90 tablet 1    BD Conchita 2nd Gen Pen Needle 32 gauge x 5/32\" needle USE 5 A DAY WITH INSULIN INJECTIONS      buPROPion SR (Wellbutrin SR) 150 mg 12 hr tablet Take 1 tablet (150 mg) by mouth once daily. Do not crush, chew, or split. Take with 300 mg tablet for total dose 450 mg 30 tablet 5    clopidogrel (Plavix) 75 mg tablet TAKE 1 TABLET BY MOUTH EVERY DAY 90 tablet 0    DULoxetine (Cymbalta) 60 mg DR capsule TAKE ONE CAPSULE BY MOUTH EVERY DAY 90 capsule 0    empagliflozin (Jardiance) 25 mg Take 1 tablet (25 mg) by mouth once daily. 90 tablet 0    glucose 4 gram chewable tablet Chew 4 tablets (16 g) if needed.      insulin glargine (Lantus Solostar U-100 Insulin) 100 unit/mL (3 mL) pen Inject 20 Units under the skin once daily at bedtime. 30 mL 1    insulin lispro (HumaLOG) 100 unit/mL injection INJECT 12 UNITS SUBCUTANEOUSLY DAILY WITH BREAKFAST AND 9 UNITS DAILY WITH DINNER      lancets (OneTouch Delica Plus Lancet) 33 gauge misc Use as directed to test blood sugars up to 5 times " a day 100 each 0    metoclopramide (Reglan) 5 mg tablet TAKE ONE TABLET BY MOUTH FOUR TIMES A  tablet 0    OneTouch Delica Plus Lancet 33 gauge misc 1 Lancet by Does not apply route 3 times a day. 100 each 11    OneTouch Verio test strips strip 1 strip by Does not apply route 3 times a day. 100 strip 11    oxybutynin XL (Ditropan-XL) 10 mg 24 hr tablet TAKE ONE TABLET BY MOUTH EVERY DAY AT BEDTIME 90 tablet 0    [DISCONTINUED] amLODIPine (Norvasc) 5 mg tablet TAKE ONE TABLET BY MOUTH DAILY 90 tablet 0    [DISCONTINUED] clopidogrel (Plavix) 75 mg tablet TAKE ONE TABLET BY MOUTH EVERY DAY 90 tablet 0    [DISCONTINUED] hydroCHLOROthiazide (HYDRODiuril) 25 mg tablet Take 1 tablet (25 mg) by mouth once daily.      [DISCONTINUED] lisinopril 40 mg tablet Take 1 tablet (40 mg) by mouth once daily. as directed 90 tablet 0    lisinopril 40 mg tablet Take 1 tablet (40 mg) by mouth once daily.      [DISCONTINUED] buPROPion XL (Wellbutrin XL) 300 mg 24 hr tablet Take 1 tablet (300 mg) by mouth once daily in the morning. Do not crush, chew, or split. Take with 150 mg tablet for total daily dose 450 mg 30 tablet 5    [DISCONTINUED] clopidogrel (Plavix) 75 mg tablet Take 1 tablet (75 mg) by mouth once daily.      [DISCONTINUED] metFORMIN (Glucophage) 500 mg tablet Take 2 tablets (1,000 mg) by mouth 2 times a day with meals.       No current facility-administered medications on file prior to visit.     No images are attached to the encounter.            Assessment/Plan   Problem List Items Addressed This Visit       Pacemaker    Hypertension     Remains off lisinopril and Amlodipine  BP remains stable          Relevant Orders    Follow Up In [facility] - Care Manager    Hospital discharge follow-up - Primary     Recent hospitalization d/t falls   Hospital records, notes, and labs reviewed          Relevant Orders    Follow Up In [facility] - Care Manager    History of CVA (cerebrovascular accident)     Relevant Orders    Follow Up In [facility] - Care Manager    Fall from standing     Recent hospitalization d/t falls   Hospital records, notes, and labs reviewed          Relevant Orders    Follow Up In [facility] - Care Manager    Diabetes mellitus (CMS/[facility])     Stable   Ha1c 5.5         Relevant Orders    Follow Up In [facility] - Care Manager    Complete heart block (CMS/[facility])     Pacemaker intact

## 2023-08-11 NOTE — TELEPHONE ENCOUNTER
Left detailed message on Xiomara's number. Per Vern today, Bupropion is 450mg actually (1 tab of 30mmg and 1 tab of 150mg).

## 2023-08-11 NOTE — TELEPHONE ENCOUNTER
Nurse burger with [facility] left a voicemail stating she has returned home from [facility] and are resuming home care services. Having bad neuropathy in lower legs, not taking anything for this. Also [facility] put her on Bupropion 150 mg, 2 times a day and wanted to confirm you would like to keep her on that ?

## 2023-08-14 ENCOUNTER — TELEPHONE (OUTPATIENT)
Dept: PRIMARY CARE | Facility: CLINIC | Age: 68
End: 2023-08-14
Payer: MEDICARE

## 2023-08-14 NOTE — TELEPHONE ENCOUNTER
Left detailed message on Brigitte's voicemail okay to continue one time a week for 4 weeks for PT.     Also notified patients  to have Glenna continue with Lantus, do not take Humalog.

## 2023-08-14 NOTE — TELEPHONE ENCOUNTER
Physical therapist Brigitte with [facility] left a voicemail stating she saw patient today for PT. need a verbal order to continue to see her for one time a week for 4 weeks. Also following up on her blood sugars it was 100 fasting today and it was 132 during her visit, should she be taking Lantus or Humalog ? Can call patient or  with that information.

## 2023-08-15 ENCOUNTER — TELEPHONE (OUTPATIENT)
Dept: PRIMARY CARE | Facility: CLINIC | Age: 68
End: 2023-08-15
Payer: MEDICARE

## 2023-08-16 ENCOUNTER — TELEPHONE (OUTPATIENT)
Dept: PRIMARY CARE | Facility: CLINIC | Age: 68
End: 2023-08-16
Payer: MEDICARE

## 2023-08-17 NOTE — TELEPHONE ENCOUNTER
Left verbal ok on Xiomara's voicemail. Asked that she call me back if she needs anything further.

## 2023-08-18 ENCOUNTER — TELEPHONE (OUTPATIENT)
Dept: PRIMARY CARE | Facility: CLINIC | Age: 68
End: 2023-08-18
Payer: MEDICARE

## 2023-08-18 ENCOUNTER — PATIENT OUTREACH (OUTPATIENT)
Dept: PRIMARY CARE | Facility: CLINIC | Age: 68
End: 2023-08-18
Payer: MEDICARE

## 2023-08-18 DIAGNOSIS — I10 PRIMARY HYPERTENSION: ICD-10-CM

## 2023-08-18 DIAGNOSIS — E11.59 TYPE 2 DIABETES MELLITUS WITH OTHER CIRCULATORY COMPLICATION, WITH LONG-TERM CURRENT USE OF INSULIN (MULTI): ICD-10-CM

## 2023-08-18 DIAGNOSIS — Z79.4 TYPE 2 DIABETES MELLITUS WITH OTHER CIRCULATORY COMPLICATION, WITH LONG-TERM CURRENT USE OF INSULIN (MULTI): ICD-10-CM

## 2023-08-18 NOTE — PROGRESS NOTES
Call received from  Alessandro with BP and Glucose readings for the last week. Starting with glucose:  Sun: 55 fasting (Lantus and Humalog both held) 1 hour later 106. 4: and 5p-110. No insulin given that day.   Mon: 100 fasting 132 at 1pm  Tues: 89 fasting 170 at 7pm   Wed: 92 fasting   Thur: 74 fasting   Today: 56 fasting     Patient has not had any Humalog for the past week. Lantus is at 20 units and she is also on Metformin and Jardiance.   - states that patient told him she would have still given herself her insulin when her glucose was 55. He told her no, but in the past he wouldn't have known to watch for this. This could be the reason for her syncopal episode/dizziness    Then he went through BP readings:  Sat: 136/77  Sun: 146/71  Mon: 148/73 *PT came out. 130/84 at 12:30 and 142/60 at 1:15pm  Tues: *OT came out: 126/70. Checked her standing up: 98/60. Then after sittin/71  Wed: 141/72 in AM  118/72 at 3:15pm  Thur: 133/70  Today: 138/63     HR varied between 69-84. Patient only on Lisinopril for BP. Patient was diagnosed in the hospital with Orthostatic Hypotension. I provided education on this and how to change slowly when changing positions. I also found out that she is not a good water drinker at all. Alessandro is trying to have her drink at least 1 16oz bottle of water a day. Otherwise she drinks pepsi and diet Dr. Pepper. I encouraged the addition of water and advised if she can try to get more, that will only help her.     Per Dr. Llanes: we are going to make the following changes to patient's medications: Discontinue Humalog and Jardiance. Reduce Lantus to 18 units daily.   Patient has follow up next week. I left information on the cell number. Advised they call me if any questions or concerns in the meantime.   Med list updated.

## 2023-08-25 ENCOUNTER — OFFICE VISIT (OUTPATIENT)
Dept: PRIMARY CARE | Facility: CLINIC | Age: 68
End: 2023-08-25
Payer: MEDICARE

## 2023-08-25 ENCOUNTER — PATIENT OUTREACH (OUTPATIENT)
Dept: PRIMARY CARE | Facility: CLINIC | Age: 68
End: 2023-08-25

## 2023-08-25 VITALS
HEART RATE: 68 BPM | DIASTOLIC BLOOD PRESSURE: 77 MMHG | SYSTOLIC BLOOD PRESSURE: 122 MMHG | OXYGEN SATURATION: 98 % | WEIGHT: 201.38 LBS | BODY MASS INDEX: 33.51 KG/M2

## 2023-08-25 DIAGNOSIS — R42 VERTIGO: ICD-10-CM

## 2023-08-25 DIAGNOSIS — I10 PRIMARY HYPERTENSION: ICD-10-CM

## 2023-08-25 DIAGNOSIS — F32.A DEPRESSION, UNSPECIFIED DEPRESSION TYPE: ICD-10-CM

## 2023-08-25 DIAGNOSIS — R32 URINARY INCONTINENCE, UNSPECIFIED TYPE: ICD-10-CM

## 2023-08-25 DIAGNOSIS — I63.9 CEREBROVASCULAR ACCIDENT (CVA), UNSPECIFIED MECHANISM (MULTI): ICD-10-CM

## 2023-08-25 DIAGNOSIS — E11.59 TYPE 2 DIABETES MELLITUS WITH OTHER CIRCULATORY COMPLICATION, WITH LONG-TERM CURRENT USE OF INSULIN (MULTI): ICD-10-CM

## 2023-08-25 DIAGNOSIS — Z79.4 TYPE 2 DIABETES MELLITUS WITH OTHER CIRCULATORY COMPLICATION, WITH LONG-TERM CURRENT USE OF INSULIN (MULTI): ICD-10-CM

## 2023-08-25 DIAGNOSIS — E78.00 PURE HYPERCHOLESTEROLEMIA: ICD-10-CM

## 2023-08-25 DIAGNOSIS — F32.9 REACTIVE DEPRESSION: ICD-10-CM

## 2023-08-25 DIAGNOSIS — I10 PRIMARY HYPERTENSION: Primary | ICD-10-CM

## 2023-08-25 DIAGNOSIS — Z79.4 TYPE 2 DIABETES MELLITUS WITHOUT COMPLICATION, WITH LONG-TERM CURRENT USE OF INSULIN (MULTI): ICD-10-CM

## 2023-08-25 DIAGNOSIS — E11.9 TYPE 2 DIABETES MELLITUS WITHOUT COMPLICATION, WITH LONG-TERM CURRENT USE OF INSULIN (MULTI): ICD-10-CM

## 2023-08-25 PROCEDURE — 3078F DIAST BP <80 MM HG: CPT | Performed by: FAMILY MEDICINE

## 2023-08-25 PROCEDURE — 1036F TOBACCO NON-USER: CPT | Performed by: FAMILY MEDICINE

## 2023-08-25 PROCEDURE — 99214 OFFICE O/P EST MOD 30 MIN: CPT | Performed by: FAMILY MEDICINE

## 2023-08-25 PROCEDURE — 3074F SYST BP LT 130 MM HG: CPT | Performed by: FAMILY MEDICINE

## 2023-08-25 PROCEDURE — 1160F RVW MEDS BY RX/DR IN RCRD: CPT | Performed by: FAMILY MEDICINE

## 2023-08-25 PROCEDURE — 4010F ACE/ARB THERAPY RXD/TAKEN: CPT | Performed by: FAMILY MEDICINE

## 2023-08-25 PROCEDURE — 1159F MED LIST DOCD IN RCRD: CPT | Performed by: FAMILY MEDICINE

## 2023-08-25 RX ORDER — MECLIZINE HYDROCHLORIDE 25 MG/1
25 TABLET ORAL 3 TIMES DAILY PRN
Qty: 30 TABLET | Refills: 11 | COMMUNITY
Start: 2023-08-25 | End: 2023-08-25 | Stop reason: SDUPTHER

## 2023-08-25 RX ORDER — ASPIRIN 81 MG/1
81 TABLET ORAL DAILY
Qty: 30 TABLET | Refills: 11 | COMMUNITY
Start: 2023-08-25

## 2023-08-25 RX ORDER — MECLIZINE HYDROCHLORIDE 25 MG/1
12.5 TABLET ORAL 3 TIMES DAILY PRN
Qty: 30 TABLET | Refills: 11 | Status: SHIPPED | OUTPATIENT
Start: 2023-08-25 | End: 2023-08-25 | Stop reason: SDUPTHER

## 2023-08-25 ASSESSMENT — ENCOUNTER SYMPTOMS
ACTIVITY CHANGE: 0
CHOKING: 0
APPETITE CHANGE: 0
CHEST TIGHTNESS: 0
FACIAL ASYMMETRY: 0
LIGHT-HEADEDNESS: 0
BACK PAIN: 0
FEVER: 0
COLOR CHANGE: 0
FATIGUE: 0
DIZZINESS: 0
HEADACHES: 0
COUGH: 0
ARTHRALGIAS: 0
PALPITATIONS: 0

## 2023-08-25 NOTE — PROGRESS NOTES
Subjective   Patient ID: Glenna Delgado is a 68 y.o. female who presents for Follow-up.    HPI   Patient is here to discuss her medications.  She is doing well with new changes.     Review of Systems   Constitutional:  Negative for activity change, appetite change, fatigue and fever.   HENT:  Negative for congestion.    Respiratory:  Negative for cough, choking and chest tightness.    Cardiovascular:  Negative for chest pain, palpitations and leg swelling.   Musculoskeletal:  Negative for arthralgias, back pain and gait problem.   Skin:  Negative for color change and pallor.   Neurological:  Negative for dizziness, facial asymmetry, light-headedness and headaches.       Objective   /77   Pulse 68   Wt 91.3 kg (201 lb 6 oz)   SpO2 98%   BMI 33.51 kg/m²   BSA Body surface area is 2.05 meters squared.      Physical Exam  Constitutional:       General: She is not in acute distress.     Appearance: Normal appearance. She is not toxic-appearing.   HENT:      Head: Normocephalic.      Right Ear: Tympanic membrane, ear canal and external ear normal.      Left Ear: Tympanic membrane, ear canal and external ear normal.      Nose: Nose normal.      Mouth/Throat:      Pharynx: Oropharynx is clear.   Eyes:      Conjunctiva/sclera: Conjunctivae normal.      Pupils: Pupils are equal, round, and reactive to light.   Cardiovascular:      Rate and Rhythm: Normal rate and regular rhythm.      Pulses: Normal pulses.      Heart sounds: Normal heart sounds.   Pulmonary:      Effort: No respiratory distress.      Breath sounds: No wheezing, rhonchi or rales.   Abdominal:      General: Bowel sounds are normal. There is no distension.      Palpations: Abdomen is soft.      Tenderness: There is no abdominal tenderness.   Musculoskeletal:         General: No swelling or tenderness.      Cervical back: No tenderness.   Skin:     Findings: No lesion or rash.   Neurological:      General: No focal deficit present.      Mental Status:  She is alert and oriented to person, place, and time. Mental status is at baseline.      Gait: Gait normal.   Psychiatric:         Mood and Affect: Mood normal.         Behavior: Behavior normal.         Thought Content: Thought content normal.         Judgment: Judgment normal.       No visits with results within 1 Year(s) from this visit.   Latest known visit with results is:   [facility] Encounter on 12/02/2021   Component Date Value Ref Range Status    Glucose 12/02/2021 183 (H)  74 - 99 mg/dL Final    Sodium 12/02/2021 138  136 - 145 mmol/L Final    Potassium 12/02/2021 4.4  3.5 - 5.3 mmol/L Final    Chloride 12/02/2021 101  98 - 107 mmol/L Final    Bicarbonate 12/02/2021 27  21 - 32 mmol/L Final    Anion Gap 12/02/2021 14  10 - 20 mmol/L Final    Urea Nitrogen 12/02/2021 19  6 - 23 mg/dL Final    Creatinine 12/02/2021 0.91  0.50 - 1.05 mg/dL Final    GLOMERULAR FILTRATION RATE-NON AFR* 12/02/2021 >60  >60 mL/min/1.73m2 Final    GLOMERULAR FILTRATION RATE-* 12/02/2021 >60  >60 mL/min/1.73m2 Final    Comment:  CALCULATIONS OF ESTIMATED GFR ARE PERFORMED   USING THE MDRD STUDY EQUATION FOR THE   IDMS-TRACEABLE CREATININE METHODS.   CLIN CHEM 2007;53:766-72      Calcium 12/02/2021 9.8  8.6 - 10.6 mg/dL Final    Albumin 12/02/2021 4.0  3.4 - 5.0 g/dL Final    Alkaline Phosphatase 12/02/2021 129  33 - 136 U/L Final    Total Protein 12/02/2021 6.8  6.4 - 8.2 g/dL Final    AST 12/02/2021 10  9 - 39 U/L Final    Total Bilirubin 12/02/2021 0.3  0.0 - 1.2 mg/dL Final    ALT (SGPT) 12/02/2021 11  7 - 45 U/L Final    Comment:  Patients treated with Sulfasalazine may generate    falsely decreased results for ALT.      Cholesterol 12/02/2021 124  0 - 199 mg/dL Final    Comment: .      AGE      DESIRABLE   BORDERLINE HIGH   HIGH     0-19 Y     0 - 169       170 - 199     >/= 200    20-24 Y     0 - 189       190 - 224     >/= 225         >24 Y     0 - 199       200 - 239     >/= 240   **All ranges are based on fasting  samples. Specific   therapeutic targets will vary based on patient-specific   cardiac risk.  .   Pediatric guidelines reference:Pediatrics 2011, 128(S5).   Adult guidelines reference: NCEP ATPIII Guidelines,     KAREEM 2001, 258:2486-97  .   Venipuncture immediately after or during the    administration of Metamizole may lead to falsely   low results. Testing should be performed immediately   prior to Metamizole dosing.      HDL 12/02/2021 35.4 (A)  mg/dL Final    Comment: .      AGE      VERY LOW   LOW     NORMAL    HIGH       0-19 Y       < 35   < 40     40-45     ----    20-24 Y       ----   < 40       >45     ----      >24 Y       ----   < 40     40-60      >60  .      Cholesterol/HDL Ratio 12/02/2021 3.5   Final    Comment: REF VALUES  DESIRABLE  < 3.4  HIGH RISK  > 5.0      LDL 12/02/2021 48  0 - 99 mg/dL Final    Comment: .                           NEAR      BORD      AGE      DESIRABLE  OPTIMAL    HIGH     HIGH     VERY HIGH     0-19 Y     0 - 109     ---    110-129   >/= 130     ----    20-24 Y     0 - 119     ---    120-159   >/= 160     ----      >24 Y     0 -  99   100-129  130-159   160-189     >/=190  .      VLDL 12/02/2021 41 (H)  0 - 40 mg/dL Final    Triglycerides 12/02/2021 205 (H)  0 - 149 mg/dL Final    Comment: .      AGE      DESIRABLE   BORDERLINE HIGH   HIGH     VERY HIGH   0 D-90 D    19 - 174         ----         ----        ----  91 D- 9 Y     0 -  74        75 -  99     >/= 100      ----    10-19 Y     0 -  89        90 - 129     >/= 130      ----    20-24 Y     0 - 114       115 - 149     >/= 150      ----         >24 Y     0 - 149       150 - 199    200- 499    >/= 500  .   Venipuncture immediately after or during the    administration of Metamizole may lead to falsely   low results. Testing should be performed immediately   prior to Metamizole dosing.      Non HDL Cholesterol 12/02/2021 89  mg/dL Final    Comment:     AGE      DESIRABLE   BORDERLINE HIGH   HIGH     VERY HIGH     0-19 Y   "   0 - 119       120 - 144     >/= 145    >/= 160    20-24 Y     0 - 149       150 - 189     >/= 190      ----         >24 Y    30 MG/DL ABOVE LDL CHOLESTEROL GOAL  .      Hemoglobin A1C 12/02/2021 6.7 (A)  % Final    Comment:      Diagnosis of Diabetes-Adults   Non-Diabetic: < or = 5.6%   Increased risk for developing diabetes: 5.7-6.4%   Diagnostic of diabetes: > or = 6.5%  .       Monitoring of Diabetes                Age (y)     Therapeutic Goal (%)   Adults:          >18           <7.0   Pediatrics:    13-18           <7.5                   7-12           <8.0                   0- 6            7.5-8.5   American Diabetes Association. Diabetes Care 33(S1), Jan 2010.      Estimated Average Glucose 12/02/2021 146  MG/DL Final     Current Outpatient Medications on File Prior to Visit   Medication Sig Dispense Refill    atorvastatin (Lipitor) 40 mg tablet Take 1 tablet (40 mg) by mouth once daily at bedtime. 90 tablet 1    BD Conchita 2nd Gen Pen Needle 32 gauge x 5/32\" needle USE 5 A DAY WITH INSULIN INJECTIONS      buPROPion SR (Wellbutrin SR) 150 mg 12 hr tablet Take 1 tablet (150 mg) by mouth once daily. Do not crush, chew, or split. Take with 300 mg tablet for total dose 450 mg 30 tablet 5    buPROPion XL (Wellbutrin XL) 300 mg 24 hr tablet Take 1 tablet (300 mg) by mouth once daily. Do not crush, chew, or split.      clopidogrel (Plavix) 75 mg tablet TAKE 1 TABLET BY MOUTH EVERY DAY 90 tablet 0    DULoxetine (Cymbalta) 60 mg DR capsule TAKE ONE CAPSULE BY MOUTH EVERY DAY 90 capsule 0    empagliflozin (Jardiance) 25 mg Take by mouth.      insulin glargine (Lantus Solostar U-100 Insulin) 100 unit/mL (3 mL) pen Inject 20 Units under the skin once daily at bedtime. (Patient taking differently: Inject 18 Units under the skin once daily at bedtime.) 30 mL 1    lancets (OneTouch Delica Plus Lancet) 33 gauge misc Use as directed to test blood sugars up to 5 times a day 100 each 0    lisinopril 40 mg tablet Take 1 tablet " (40 mg) by mouth once daily.      metFORMIN (Glucophage) 500 mg tablet Take 2 tablets (1,000 mg) by mouth 2 times a day. 360 tablet 3    metoclopramide (Reglan) 5 mg tablet TAKE ONE TABLET BY MOUTH FOUR TIMES A  tablet 0    OneTouch Delica Plus Lancet 33 gauge misc 1 Lancet by Does not apply route 3 times a day. 100 each 11    OneTouch Verio test strips strip 1 strip by Does not apply route 3 times a day. 100 strip 11    oxybutynin XL (Ditropan-XL) 10 mg 24 hr tablet TAKE ONE TABLET BY MOUTH EVERY DAY AT BEDTIME 90 tablet 0    glucose 4 gram chewable tablet Chew 4 tablets (16 g) if needed.       No current facility-administered medications on file prior to visit.     No images are attached to the encounter.            Assessment/Plan   Diagnoses and all orders for this visit:  Primary hypertension  Pure hypercholesterolemia  Type 2 diabetes mellitus with other circulatory complication, with long-term current use of insulin (CMS/[facility])  1.  Patient continue on medications as recommended  2.  Patient to call for questions or concerns

## 2023-08-25 NOTE — PROGRESS NOTES
Met with patient while she was here to see PCP. Sugars much improved. Fasting levels since last Saturday 80, 88, 86, 80, 77, 83, 83. BP since Saturday: 131/68, 134/72, 130/70, 151/77, 144/72, 147/76, and 122/77. No changes in DM medications or HTN medications per PCP. Patient did inquire about meclizine for dizziness and asa 81mg for health maintenance. Ok per PCP so I added this to their home medication list. Patient overall feels better. No episodes of syncope. Some dizziness as mentioned about but the meclizine does help. I would like to check in again with them next Friday to see how her numbers are looking. Encouraged patient to drink at least 20 oz water daily.

## 2023-08-28 DIAGNOSIS — R32 URINARY INCONTINENCE, UNSPECIFIED TYPE: ICD-10-CM

## 2023-08-29 ENCOUNTER — PATIENT OUTREACH (OUTPATIENT)
Dept: PRIMARY CARE | Facility: CLINIC | Age: 68
End: 2023-08-29
Payer: MEDICARE

## 2023-08-29 ENCOUNTER — TELEPHONE (OUTPATIENT)
Dept: PRIMARY CARE | Facility: CLINIC | Age: 68
End: 2023-08-29
Payer: MEDICARE

## 2023-08-29 DIAGNOSIS — F41.9 ANXIETY: ICD-10-CM

## 2023-08-29 DIAGNOSIS — Z79.4 TYPE 2 DIABETES MELLITUS WITH OTHER CIRCULATORY COMPLICATION, WITH LONG-TERM CURRENT USE OF INSULIN (MULTI): ICD-10-CM

## 2023-08-29 DIAGNOSIS — F40.243 FEAR OF FLYING: ICD-10-CM

## 2023-08-29 DIAGNOSIS — E11.59 TYPE 2 DIABETES MELLITUS WITH OTHER CIRCULATORY COMPLICATION, WITH LONG-TERM CURRENT USE OF INSULIN (MULTI): ICD-10-CM

## 2023-08-29 DIAGNOSIS — I10 PRIMARY HYPERTENSION: ICD-10-CM

## 2023-08-29 RX ORDER — ATORVASTATIN CALCIUM 40 MG/1
40 TABLET, FILM COATED ORAL NIGHTLY
Qty: 90 TABLET | Refills: 1 | Status: SHIPPED | OUTPATIENT
Start: 2023-08-29 | End: 2024-04-26 | Stop reason: SDUPTHER

## 2023-08-29 RX ORDER — METOCLOPRAMIDE 5 MG/1
5 TABLET ORAL 4 TIMES DAILY
Qty: 360 TABLET | Refills: 1 | Status: SHIPPED | OUTPATIENT
Start: 2023-08-29 | End: 2024-03-26 | Stop reason: SDUPTHER

## 2023-08-29 RX ORDER — OXYBUTYNIN CHLORIDE 10 MG/1
TABLET, EXTENDED RELEASE ORAL
Qty: 90 TABLET | Refills: 0 | Status: SHIPPED | OUTPATIENT
Start: 2023-08-29 | End: 2023-11-15 | Stop reason: WASHOUT

## 2023-08-29 RX ORDER — BUPROPION HYDROCHLORIDE 300 MG/1
300 TABLET ORAL DAILY
Qty: 90 TABLET | Refills: 1 | Status: SHIPPED | OUTPATIENT
Start: 2023-08-29

## 2023-08-29 RX ORDER — LISINOPRIL 40 MG/1
40 TABLET ORAL DAILY
Qty: 90 TABLET | Refills: 1 | Status: SHIPPED | OUTPATIENT
Start: 2023-08-29 | End: 2024-04-12 | Stop reason: SDUPTHER

## 2023-08-29 RX ORDER — OXYBUTYNIN CHLORIDE 10 MG/1
10 TABLET, EXTENDED RELEASE ORAL NIGHTLY
Qty: 90 TABLET | Refills: 1 | Status: SHIPPED | OUTPATIENT
Start: 2023-08-29 | End: 2024-02-19 | Stop reason: SDUPTHER

## 2023-08-29 RX ORDER — CLOPIDOGREL BISULFATE 75 MG/1
75 TABLET ORAL DAILY
Qty: 90 TABLET | Refills: 1 | Status: SHIPPED | OUTPATIENT
Start: 2023-08-29 | End: 2024-01-18 | Stop reason: SDUPTHER

## 2023-08-29 RX ORDER — DULOXETIN HYDROCHLORIDE 60 MG/1
60 CAPSULE, DELAYED RELEASE ORAL DAILY
Qty: 90 CAPSULE | Refills: 1 | Status: SHIPPED | OUTPATIENT
Start: 2023-08-29 | End: 2024-03-26 | Stop reason: SDUPTHER

## 2023-08-29 RX ORDER — MECLIZINE HYDROCHLORIDE 25 MG/1
12.5 TABLET ORAL 3 TIMES DAILY PRN
Qty: 30 TABLET | Refills: 11 | Status: SHIPPED | OUTPATIENT
Start: 2023-08-29

## 2023-08-29 NOTE — PROGRESS NOTES
Call received from . He asked about refills that were to be sent in on Friday. He said that [facility] doesn't have the Rx's and she just took her last oxybutynin tablet. I checked the system and it looks like Dr. Llanes just approved the Rx's this morning. I called the pharmacy to confirm and also to ask them to discontinue one of the oxybutynin rx's. It was sent over twice: one with 1 refill and one with 0 refills. I asked if they can cancel the one without refills because all her other rx's had 1 refill on them. I would like to keep them all around the same time.     Also  asked about getting patient a CGM? She had a low glucose level of 54 the other morning and this is even with the changes in her insulin regimen. We have already stopped her Jardiance and her humalog and decreased her lantus. PCP is agreeable to the CGM so I was able to go to [place] and complete and order to [facility]. I will keep patient/ updated on the order.      did also ask about a refill of Lorazepam that patient takes prior to flying. She gets Lorazepam 0.5mg tabs. Take 0.5mg to 1mg 30 mins prior to flight. She usually gets about 4 tabs. They are flying to [place] on Sept 19th and she will need this. I will pend to PCP and see if she approves.

## 2023-09-01 ENCOUNTER — PATIENT OUTREACH (OUTPATIENT)
Dept: PRIMARY CARE | Facility: CLINIC | Age: 68
End: 2023-09-01
Payer: MEDICARE

## 2023-09-01 DIAGNOSIS — I10 PRIMARY HYPERTENSION: ICD-10-CM

## 2023-09-01 DIAGNOSIS — E11.59 TYPE 2 DIABETES MELLITUS WITH OTHER CIRCULATORY COMPLICATION, WITH LONG-TERM CURRENT USE OF INSULIN (MULTI): ICD-10-CM

## 2023-09-01 DIAGNOSIS — Z79.4 TYPE 2 DIABETES MELLITUS WITH OTHER CIRCULATORY COMPLICATION, WITH LONG-TERM CURRENT USE OF INSULIN (MULTI): ICD-10-CM

## 2023-09-01 NOTE — PROGRESS NOTES
I spoke to  regarding this past weeks glucose levels and BP levels. They are as follows:  Fasting AM Glucose:  Sat 8/26: 62  Sun 8/27: 90  Mon 8/28: 59  Tues 8/29: 79  Wed 8/20: 83  Thur 8/31: 68  Fri 9/1: 71  *Patient is on 18 units of Lantus and her Metformin 1,000 mg BID    Blood Pressure readings:  Sat 8/26: N/A-Forgot  Sun 8/27: 137/67  Mon 8/28: 146/73 in am and 130/70 in pm with [facility]  Tue 8/29: 143/71 in am and 124/78 in pm with [facility]  Wed 8/30: 133/66 in am and 150/73 in pm with [facility]  Thur 8/31: 153/101 in am; 20 mins later 149/73 and 149/73 in pm with [facility]  Fri 9/1: 141/75 in am.   *Patient is on Lisinopril 40mg*    Would you like to make any changes to her medications? She is feeling ok. Still has some dizziness, but nothing new.  states she has been dizzy since she had her stroke.

## 2023-09-01 NOTE — TELEPHONE ENCOUNTER
states they are not planning on traveling in the next year. The only reason they are going on this trip is because of a  service for his cousin. But he is aware that if that changes, she will need to be seen in person. As of now, he states they only need #4 tabs please.

## 2023-09-05 RX ORDER — LORAZEPAM 0.5 MG/1
TABLET ORAL
Qty: 4 TABLET | Refills: 0 | Status: SHIPPED | OUTPATIENT
Start: 2023-09-05

## 2023-09-08 ENCOUNTER — TELEPHONE (OUTPATIENT)
Dept: PRIMARY CARE | Facility: CLINIC | Age: 68
End: 2023-09-08
Payer: MEDICARE

## 2023-09-08 ENCOUNTER — PATIENT OUTREACH (OUTPATIENT)
Dept: PRIMARY CARE | Facility: CLINIC | Age: 68
End: 2023-09-08
Payer: MEDICARE

## 2023-09-08 DIAGNOSIS — Z79.4 TYPE 2 DIABETES MELLITUS WITH OTHER CIRCULATORY COMPLICATION, WITH LONG-TERM CURRENT USE OF INSULIN (MULTI): ICD-10-CM

## 2023-09-08 DIAGNOSIS — E11.59 TYPE 2 DIABETES MELLITUS WITH OTHER CIRCULATORY COMPLICATION, WITH LONG-TERM CURRENT USE OF INSULIN (MULTI): ICD-10-CM

## 2023-09-08 DIAGNOSIS — I10 PRIMARY HYPERTENSION: ICD-10-CM

## 2023-09-08 NOTE — TELEPHONE ENCOUNTER
Nurse with [facility] left a voicemail stating they did a re certification today and going to continue skilled nursing for a couple more weeks just to monitor her changes in meds and make sure she is doing okay.     [phone]

## 2023-09-08 NOTE — PROGRESS NOTES
Call placed to  to get glucose and BP     Glucose readings from this week:  9/2: 69  9/3: 105  9/4: 85  9/5: 81  9/6: 91  9/7: 82  9/8: 87    *Lantus 16 units.     BP and HR readings from this week:  9/2: 145/72  71  9/3: 147/71  75  9/4: 137/72  73  9/5: 136/71  78  9/6: 138/74  79  9/7: 127/66  74  9/8: 146/66  69  1pm: 128/76     *Lisinopril 40mg  -I did ask when patient's BP is being taken. Per , it is being taken about 1 hour after she takes her meds, eats breakfast and has her a 4-6 oz drink of Pepsi. So perhaps the caffeine is causing it to be a little more elevated in the am? I just thought I would mention it in case it is significant.     Patient overall feeling good. Still has some headaches and dizziness, but this is not new.

## 2023-09-15 ENCOUNTER — PATIENT OUTREACH (OUTPATIENT)
Dept: PRIMARY CARE | Facility: CLINIC | Age: 68
End: 2023-09-15
Payer: MEDICARE

## 2023-09-15 DIAGNOSIS — Z79.4 TYPE 2 DIABETES MELLITUS WITH OTHER CIRCULATORY COMPLICATION, WITH LONG-TERM CURRENT USE OF INSULIN (MULTI): ICD-10-CM

## 2023-09-15 DIAGNOSIS — E11.59 TYPE 2 DIABETES MELLITUS WITH OTHER CIRCULATORY COMPLICATION, WITH LONG-TERM CURRENT USE OF INSULIN (MULTI): ICD-10-CM

## 2023-09-15 DIAGNOSIS — I10 PRIMARY HYPERTENSION: ICD-10-CM

## 2023-09-15 NOTE — PROGRESS NOTES
Call placed to patient/ to see how her numbers have been this last week. Patient answered and states he is out mowing the grass. She will have him call me back either today or Monday.

## 2023-09-18 ENCOUNTER — PATIENT OUTREACH (OUTPATIENT)
Dept: PRIMARY CARE | Facility: CLINIC | Age: 68
End: 2023-09-18
Payer: MEDICARE

## 2023-09-18 DIAGNOSIS — E11.59 TYPE 2 DIABETES MELLITUS WITH OTHER CIRCULATORY COMPLICATION, WITH LONG-TERM CURRENT USE OF INSULIN (MULTI): ICD-10-CM

## 2023-09-18 DIAGNOSIS — I10 PRIMARY HYPERTENSION: ICD-10-CM

## 2023-09-18 DIAGNOSIS — Z79.4 TYPE 2 DIABETES MELLITUS WITH OTHER CIRCULATORY COMPLICATION, WITH LONG-TERM CURRENT USE OF INSULIN (MULTI): ICD-10-CM

## 2023-09-18 PROCEDURE — 99487 CPLX CHRNC CARE 1ST 60 MIN: CPT | Performed by: FAMILY MEDICINE

## 2023-09-18 NOTE — PROGRESS NOTES
Discussed glucose and BP readings with . Readings are as follows:  Sat 9/9: 112  132/77  Sun 9/10: 100  152/71  Mon 9/11: 88  142/79  Tues 9/12: 107  No BP reading  Wed 9/13: 98  144/76  Thur 9/14: 98 129/69  Fri 9/15: 137  135/73  Sat 9/16: 116  136/70  Sun 9/17: No readings  Mon 9/18: 107  128/69      reports that patient had a fall on Saturday. She was stepping backward off the scale and went down. She did not hit anything. She has no injuries or even any bruises. I will discuss with PCP and let them know if she recommends doing anything specific about this.     Advised that I would speak to PCP, but for now: No changes in medications based off these numbers.

## 2023-09-20 ENCOUNTER — PATIENT OUTREACH (OUTPATIENT)
Dept: PRIMARY CARE | Facility: CLINIC | Age: 68
End: 2023-09-20
Payer: MEDICARE

## 2023-09-20 ENCOUNTER — PATIENT MESSAGE (OUTPATIENT)
Dept: PRIMARY CARE | Facility: CLINIC | Age: 68
End: 2023-09-20
Payer: MEDICARE

## 2023-09-20 DIAGNOSIS — I10 PRIMARY HYPERTENSION: ICD-10-CM

## 2023-09-20 DIAGNOSIS — Z79.4 TYPE 2 DIABETES MELLITUS WITH OTHER CIRCULATORY COMPLICATION, WITH LONG-TERM CURRENT USE OF INSULIN (MULTI): ICD-10-CM

## 2023-09-20 DIAGNOSIS — E11.59 TYPE 2 DIABETES MELLITUS WITH OTHER CIRCULATORY COMPLICATION, WITH LONG-TERM CURRENT USE OF INSULIN (MULTI): ICD-10-CM

## 2023-09-20 NOTE — PROGRESS NOTES
Discussed recent fall with PCP. She suggested we discontinue daily weights in order to prevent any further falls. I did call [facility] and communicated that to the nurse. There does not appear to be an actual order for this, nor does she have a dx of CHF. So she will note this to the team to discourage patient from doing daily weights.

## 2023-10-02 ENCOUNTER — TELEPHONE (OUTPATIENT)
Dept: PRIMARY CARE | Facility: CLINIC | Age: 68
End: 2023-10-02
Payer: MEDICARE

## 2023-10-03 ENCOUNTER — TELEPHONE (OUTPATIENT)
Dept: PRIMARY CARE | Facility: CLINIC | Age: 68
End: 2023-10-03
Payer: MEDICARE

## 2023-10-03 DIAGNOSIS — R29.898 MUSCULAR DECONDITIONING: ICD-10-CM

## 2023-10-03 DIAGNOSIS — R53.81 PHYSICAL DECONDITIONING: ICD-10-CM

## 2023-10-11 ENCOUNTER — PATIENT OUTREACH (OUTPATIENT)
Dept: PRIMARY CARE | Facility: CLINIC | Age: 68
End: 2023-10-11
Payer: MEDICARE

## 2023-10-11 DIAGNOSIS — Z79.4 TYPE 2 DIABETES MELLITUS WITH OTHER CIRCULATORY COMPLICATION, WITH LONG-TERM CURRENT USE OF INSULIN (MULTI): ICD-10-CM

## 2023-10-11 DIAGNOSIS — E11.59 TYPE 2 DIABETES MELLITUS WITH OTHER CIRCULATORY COMPLICATION, WITH LONG-TERM CURRENT USE OF INSULIN (MULTI): ICD-10-CM

## 2023-10-11 NOTE — PROGRESS NOTES
Message received from Alessandro yesterday that they received patient's [employer] Tyshawn in the mail. He was looking to set up an appt with us for instruction. Called patient back, but had to leave a message to return my call.

## 2023-10-12 ENCOUNTER — PATIENT OUTREACH (OUTPATIENT)
Dept: PRIMARY CARE | Facility: CLINIC | Age: 68
End: 2023-10-12
Payer: MEDICARE

## 2023-10-12 DIAGNOSIS — Z79.4 TYPE 2 DIABETES MELLITUS WITH OTHER CIRCULATORY COMPLICATION, WITH LONG-TERM CURRENT USE OF INSULIN (MULTI): ICD-10-CM

## 2023-10-12 DIAGNOSIS — I10 PRIMARY HYPERTENSION: ICD-10-CM

## 2023-10-12 DIAGNOSIS — E11.59 TYPE 2 DIABETES MELLITUS WITH OTHER CIRCULATORY COMPLICATION, WITH LONG-TERM CURRENT USE OF INSULIN (MULTI): ICD-10-CM

## 2023-10-12 NOTE — PROGRESS NOTES
Spoke to Alessandro. He was unable to talk at this time. We scheduled a time to talk tomorrow. I will call him at 4pm tomorrow

## 2023-10-13 ENCOUNTER — PATIENT OUTREACH (OUTPATIENT)
Dept: PRIMARY CARE | Facility: CLINIC | Age: 68
End: 2023-10-13
Payer: MEDICARE

## 2023-10-13 DIAGNOSIS — I10 PRIMARY HYPERTENSION: ICD-10-CM

## 2023-10-13 DIAGNOSIS — E11.59 TYPE 2 DIABETES MELLITUS WITH OTHER CIRCULATORY COMPLICATION, WITH LONG-TERM CURRENT USE OF INSULIN (MULTI): ICD-10-CM

## 2023-10-13 DIAGNOSIS — Z79.4 TYPE 2 DIABETES MELLITUS WITH OTHER CIRCULATORY COMPLICATION, WITH LONG-TERM CURRENT USE OF INSULIN (MULTI): ICD-10-CM

## 2023-10-13 NOTE — PROGRESS NOTES
Call placed to patient and  Alessandro. Patient did very well with her flights on their trip. She did not end up taking the Ativan as she didn't want to be too out of it. She got a little anxious but overall, dealt with it well.     Glucose levels and BP levels reviewed for the last week and are as follows:  10-7: 100  138/71   10-8: 97  129/75  10-9: 123  139/73  10-10: 100 144/66  10-11: 111 137/65  10-12: 103  153/88  10-13: 97  143/69    Overall the sugars look great. Her BP's are a little on the higher side, but I would like to review with PCP to see what her thoughts are. I did talk to PCP and she does not want to change any HTN medication right now for fear that she will become hypotensive. She advised that they continue taking this daily and recording the levels.     We did decide to review all her current medications. Patient has been taking them herself without Alessandro watching her. She states she is only taking 1 tab of the 500mg Metformin once at bedtime. This is different than the order of 2 tabs of 500mg Twice a day. Then we talked about Reglan that is supposed to be 1 tab 4 times a day, but she states she is only doing it once a day. But at one point she referred to Reglan and metformin as the same thing and myself and Alessandro had to correct her and educate her that they were two different medicines. I even reviewed what each one should look like. So potentially patient has reduced her Metformin and her sugars have remained at a good level. She did have some recent issues with constipation which I said potentially could be due to the sudden change in the Reglan dosage. She takes this for gastroparesis and she was reporting feeling very full and not eating much this past week. She had to take Dulcolax in order to have a bowel movement after about 1 1/2 weeks. I did advise that if this happens again, not waiting so long to take the dulcolax. Alessandro agrees to implement after 3-4 days of no bowel movements in the  future.    Regarding her medications, I advised that over the weekend, I would like Alessandro to watch her take her medicines. Not necessarily tell her what to take, but to see what she is taking and how much. That way we can compare that to her medication list and change as needed. Patient may say she is only taking something one way, but when it comes time to actually do it, we may see something different.     We did also set up a time for patient to come in to see the pharmacist for education regarding the freestyle Tyshawn they received. They are coming in on Tuesday 10-17 at 2pm.

## 2023-10-17 ENCOUNTER — CLINICAL SUPPORT (OUTPATIENT)
Dept: PRIMARY CARE | Facility: CLINIC | Age: 68
End: 2023-10-17
Payer: MEDICARE

## 2023-10-17 DIAGNOSIS — E11.59 TYPE 2 DIABETES MELLITUS WITH OTHER CIRCULATORY COMPLICATION, WITH LONG-TERM CURRENT USE OF INSULIN (MULTI): Primary | ICD-10-CM

## 2023-10-17 DIAGNOSIS — Z79.4 TYPE 2 DIABETES MELLITUS WITH OTHER CIRCULATORY COMPLICATION, WITH LONG-TERM CURRENT USE OF INSULIN (MULTI): Primary | ICD-10-CM

## 2023-10-17 DIAGNOSIS — Z79.4 TYPE 2 DIABETES MELLITUS WITH OTHER CIRCULATORY COMPLICATION, WITH LONG-TERM CURRENT USE OF INSULIN (MULTI): ICD-10-CM

## 2023-10-17 DIAGNOSIS — E11.59 TYPE 2 DIABETES MELLITUS WITH OTHER CIRCULATORY COMPLICATION, WITH LONG-TERM CURRENT USE OF INSULIN (MULTI): ICD-10-CM

## 2023-10-17 NOTE — PROGRESS NOTES
"Subjective   Patient ID: Glenna Delgado is a 68 y.o. female who presents for Diabetes ([employer]).    Referring Provider: DO ELMO Bentley  Glenna Delgado and caregiver presents for an in-patient visit for [employer] education. Patient's BG is well controlled, overall blood sugar range is 97- 111 mg/dL. Patient is currently taking Metformin 500 mg take 2 tablets by mouth twice daily and Lantus inject 20 units subcutaneous once daily at bed time.     Review of Systems    Objective     There were no vitals taken for this visit.     Labs  Lab Results   Component Value Date    BILITOT 0.3 12/02/2021    CALCIUM 9.8 12/02/2021    CO2 27 12/02/2021     12/02/2021    CREATININE 0.91 12/02/2021    GLUCOSE 183 (H) 12/02/2021    ALKPHOS 129 12/02/2021    K 4.4 12/02/2021    PROT 6.8 12/02/2021     12/02/2021    AST 10 12/02/2021    ALT 11 12/02/2021    BUN 19 12/02/2021    ANIONGAP 14 12/02/2021    ALBUMIN 4.0 12/02/2021     Lab Results   Component Value Date    TRIG 205 (H) 12/02/2021    CHOL 124 12/02/2021    HDL 35.4 (A) 12/02/2021     Lab Results   Component Value Date    HGBA1C 5.5 07/16/2023       Current Outpatient Medications on File Prior to Visit   Medication Sig Dispense Refill    aspirin 81 mg EC tablet Take 1 tablet (81 mg) by mouth once daily. 30 tablet 11    atorvastatin (Lipitor) 40 mg tablet Take 1 tablet (40 mg) by mouth once daily at bedtime. 90 tablet 1    BD Conchita 2nd Gen Pen Needle 32 gauge x 5/32\" needle USE 5 A DAY WITH INSULIN INJECTIONS      buPROPion SR (Wellbutrin SR) 150 mg 12 hr tablet Take 1 tablet (150 mg) by mouth once daily. Do not crush, chew, or split. Take with 300 mg tablet for total dose 450 mg 30 tablet 5    buPROPion XL (Wellbutrin XL) 300 mg 24 hr tablet Take 1 tablet (300 mg) by mouth once daily. Do not crush, chew, or split. 90 tablet 1    clopidogrel (Plavix) 75 mg tablet Take 1 tablet (75 mg) by mouth once daily. 90 tablet 1    DULoxetine " (Cymbalta) 60 mg DR capsule Take 1 capsule (60 mg) by mouth once daily. Do not crush or chew. 90 capsule 1    glucose 4 gram chewable tablet Chew 4 tablets (16 g) if needed.      insulin glargine (Lantus Solostar U-100 Insulin) 100 unit/mL (3 mL) pen Inject 20 Units under the skin once daily at bedtime. (Patient taking differently: Inject 16 Units under the skin once daily at bedtime.) 30 mL 1    lancets (OneTouch Delica Plus Lancet) 33 gauge misc Use as directed to test blood sugars up to 5 times a day 100 each 0    lisinopril 40 mg tablet Take 1 tablet (40 mg) by mouth once daily. 90 tablet 1    LORazepam (Ativan) 0.5 mg tablet Take 1 to 2 tablets (0.5mg to 1mg) 30 minutes prior to flight 4 tablet 0    meclizine (Antivert) 25 mg tablet Take 0.5 tablets (12.5 mg) by mouth 3 times a day as needed for dizziness. 30 tablet 11    metFORMIN (Glucophage) 500 mg tablet Take 2 tablets (1,000 mg) by mouth 2 times a day. (Patient taking differently: Take 1 tablet (500 mg) by mouth once daily at bedtime.) 360 tablet 3    metoclopramide (Reglan) 5 mg tablet Take 1 tablet (5 mg) by mouth 4 times a day. 360 tablet 1    OneTouch Delica Plus Lancet 33 gauge misc 1 Lancet by Does not apply route 3 times a day. 100 each 11    OneTouch Verio test strips strip 1 strip by Does not apply route 3 times a day. 100 strip 11    oxybutynin XL (Ditropan-XL) 10 mg 24 hr tablet Take 1 tablet (10 mg) by mouth once daily at bedtime. Do not crush, chew, or split. 90 tablet 1    oxybutynin XL (Ditropan-XL) 10 mg 24 hr tablet TAKE ONE TABLET BY MOUTH EVERY DAY AT BEDTIME 90 tablet 0     No current facility-administered medications on file prior to visit.        Assessment/Plan   Problem List Items Addressed This Visit             ICD-10-CM    Diabetes mellitus (CMS/[facility]) E11.9     Assessment/Plan  Patient's BG is well controlled, overall blood sugar range is 97- 111 mg/dL. Goals: FB-130 mg/dL, 2HPP: <180 mg/dL.  Educated patient and caregiver on  how to install freestyle Tyshawn sensor and how to use the reader.   Counseled patient on the difference between Tyshawn and test strips.   Advised patient to check their BG with Tyshawn at least 3 times a day (morning, afternoon, and bedtime). Usually every 8 hours. Double check with the test strips when your sugar is low or too high.   Counseled patient on hypoglycemia symptoms and resolutions. Freestyle Tyshawn alarm will go off when your BG is running high or low.   Provided the contact information of the Freestyle Tyshawn's  and the supplier ([facility]), if sensor keeps falling off or if need a new refill. Also, an adhesive can be purchased at [facility] if sensor keeps falling off.  Continue to take all other medications as prescribed by your provider.   Follow up with patient in 2 weeks. 11/3/2023.             FELIPE CATES CPhT    Continue all meds under the continuation of care with the referring provider and clinical pharmacy team.

## 2023-10-17 NOTE — ASSESSMENT & PLAN NOTE
Assessment/Plan  Patient's BG is well controlled, overall blood sugar range is 97- 111 mg/dL. Goals: FB-130 mg/dL, 2HPP: <180 mg/dL.  Educated patient and caregiver on how to install freestyle Tyshawn sensor and how to use the reader.   Counseled patient on the difference between Tyshawn and test strips.   Advised patient to check their BG with Tyshawn at least 3 times a day (morning, afternoon, and bedtime). Usually every 8 hours. Double check with the test strips when your sugar is low or too high.   Counseled patient on hypoglycemia symptoms and resolutions. Freestyle Tyshawn alarm will go off when your BG is running high or low.   Provided the contact information of the Freestyle Tyshawn's  and the supplier ([facility]), if sensor keeps falling off or if need a new refill. Also, an adhesive can be purchased at [facility] if sensor keeps falling off.  Continue to take all other medications as prescribed by your provider.   Follow up with patient in 2 weeks. 11/3/2023.

## 2023-10-19 NOTE — PROGRESS NOTES
I reviewed the progress note and agree with the resident’s findings and plans as written. Case discussed with resident.    Melvni Flores, PharmD

## 2023-10-23 ENCOUNTER — PATIENT OUTREACH (OUTPATIENT)
Dept: PRIMARY CARE | Facility: CLINIC | Age: 68
End: 2023-10-23
Payer: MEDICARE

## 2023-10-23 DIAGNOSIS — I10 PRIMARY HYPERTENSION: ICD-10-CM

## 2023-10-23 DIAGNOSIS — E11.59 TYPE 2 DIABETES MELLITUS WITH OTHER CIRCULATORY COMPLICATION, WITH LONG-TERM CURRENT USE OF INSULIN (MULTI): ICD-10-CM

## 2023-10-23 DIAGNOSIS — Z79.4 TYPE 2 DIABETES MELLITUS WITH OTHER CIRCULATORY COMPLICATION, WITH LONG-TERM CURRENT USE OF INSULIN (MULTI): ICD-10-CM

## 2023-10-23 NOTE — PROGRESS NOTES
Patient's  calls me back. We talked about how much he is liking the Freestyle Tyshawn CGM. He states it has stayed on her arm and there have been no problems. He did say there have been times when she gets too far away from the reader and it will alarm. He states that the alarm did go off once in the middle of the night for a low reading of 67. He got her some pepsi to drink and about 1 hour later it was 100. She reportedly eats dinner between 4-7pm. Bedtime is around 10pm. She does not usually have any snack before bed, but when she does it is usually a cookie, drumstick or other sweet dessert. Fasting am glucose levels have been between 89 up to 120. He did confirm over the weekend that patient has only been taking 1 tab of Metformin at bedtime. So as of this time, I did not recommend any further changes in her medications. I did provide education on snacks before bed. I feel this would be good for her to implement because she goes to bed around 10pm and sleeps until about 10am. Meanwhile, the last time she ate was around 4-7pm. I educated on the types of food she should try to eat before bed including protein and complex carbs. Explained how sugary treats before bed will raise glucose levels but then cause it to drop potentially causing hypoglycemia.     Alessandro asked for a refill on patient's Bupropion 150mg. She takes this along with 300mg daily. I checked her med list and it looks like she should have refills on the last script. He states they tried to call it in and they could not do it. I called the pharmacy and was told it is because they have it being most recently filled on 10/9 and picked up on 10/14. I let patient/Alessandro know this and that it is probably in the house somewhere. He did call me back after hours and left a message that they did find the bottle. Patient got confused and thought she did not take this medication anymore.

## 2023-11-10 ENCOUNTER — TELEMEDICINE (OUTPATIENT)
Dept: PHARMACY | Facility: HOSPITAL | Age: 68
End: 2023-11-10
Payer: MEDICARE

## 2023-11-10 DIAGNOSIS — E11.59 TYPE 2 DIABETES MELLITUS WITH OTHER CIRCULATORY COMPLICATION, WITH LONG-TERM CURRENT USE OF INSULIN (MULTI): Primary | ICD-10-CM

## 2023-11-10 DIAGNOSIS — Z79.4 TYPE 2 DIABETES MELLITUS WITH OTHER CIRCULATORY COMPLICATION, WITH LONG-TERM CURRENT USE OF INSULIN (MULTI): ICD-10-CM

## 2023-11-10 DIAGNOSIS — E11.59 TYPE 2 DIABETES MELLITUS WITH OTHER CIRCULATORY COMPLICATION, WITH LONG-TERM CURRENT USE OF INSULIN (MULTI): ICD-10-CM

## 2023-11-10 DIAGNOSIS — Z79.4 TYPE 2 DIABETES MELLITUS WITH OTHER CIRCULATORY COMPLICATION, WITH LONG-TERM CURRENT USE OF INSULIN (MULTI): Primary | ICD-10-CM

## 2023-11-10 NOTE — ASSESSMENT & PLAN NOTE
Assessment  Patient has been doing well with the [facility]wn CGM.  Patient BG is stable with 7 days average BG-116 mg/dL and 14 days average -114 mg/dL (Target glucose range  mg/dL). A1c is 5.5% (goal <7%).   Patient had few hypoglycemia events occurred in the middle of the night with the Tyshawn reading alarm going off. Patient and caregiver are aware of how to manage hypoglycemia.     Plan  Continue to take medications as prescribed by your provider.  Continue to monitor your BG daily.   Encouraged patient to be more consistent on adding protein and complex carbs in her diet for dinner.   Follow up with patient in 3 weeks.

## 2023-11-10 NOTE — PROGRESS NOTES
Subjective   Patient ID: Glenna Delgado is a 68 y.o. female who presents for Diabetes (Follow up ).    Referring Provider: Marleen Llanes,      Diabetes  She presents for her follow-up diabetic visit. She has type 2 diabetes mellitus. Her disease course has been stable.   Spoke to patient's  who stated that patient has been doing well with the Freestyle Tyshawn CGM. He reported an average 7 days BG of 116 mg/dL and 14 days average is 114 mg/dL. He stated that the lowest BG level they got is 53 mg/dL and usually will go back to normal BG level once she takes 15 grams of carbs. There were times the reader alarm went off during the night. He reported that patient still goes to bed around 10pm and sleeps until about 10am (takes last meal around 4-7 pm). No hypoglycemia symptoms reported.     From the last visit with the pharmacist, patient was educated on the types of food she should try to eat before bed including protein and complex carbs. Patient has not been consistent with this diet but they are working towards it.     Review of Systems    Objective     There were no vitals taken for this visit.     Labs  Lab Results   Component Value Date    BILITOT 0.3 12/02/2021    CALCIUM 9.8 12/02/2021    CO2 27 12/02/2021     12/02/2021    CREATININE 0.91 12/02/2021    GLUCOSE 183 (H) 12/02/2021    ALKPHOS 129 12/02/2021    K 4.4 12/02/2021    PROT 6.8 12/02/2021     12/02/2021    AST 10 12/02/2021    ALT 11 12/02/2021    BUN 19 12/02/2021    ANIONGAP 14 12/02/2021    ALBUMIN 4.0 12/02/2021     Lab Results   Component Value Date    TRIG 205 (H) 12/02/2021    CHOL 124 12/02/2021    HDL 35.4 (A) 12/02/2021     Lab Results   Component Value Date    HGBA1C 5.5 07/16/2023       Current Outpatient Medications on File Prior to Visit   Medication Sig Dispense Refill    aspirin 81 mg EC tablet Take 1 tablet (81 mg) by mouth once daily. 30 tablet 11    atorvastatin (Lipitor) 40 mg tablet Take 1 tablet (40 mg) by  "mouth once daily at bedtime. 90 tablet 1    BD Conchita 2nd Gen Pen Needle 32 gauge x 5/32\" needle USE 5 A DAY WITH INSULIN INJECTIONS      buPROPion SR (Wellbutrin SR) 150 mg 12 hr tablet Take 1 tablet (150 mg) by mouth once daily. Do not crush, chew, or split. Take with 300 mg tablet for total dose 450 mg 30 tablet 5    buPROPion XL (Wellbutrin XL) 300 mg 24 hr tablet Take 1 tablet (300 mg) by mouth once daily. Do not crush, chew, or split. 90 tablet 1    clopidogrel (Plavix) 75 mg tablet Take 1 tablet (75 mg) by mouth once daily. 90 tablet 1    DULoxetine (Cymbalta) 60 mg DR capsule Take 1 capsule (60 mg) by mouth once daily. Do not crush or chew. 90 capsule 1    glucose 4 gram chewable tablet Chew 4 tablets (16 g) if needed.      insulin glargine (Lantus Solostar U-100 Insulin) 100 unit/mL (3 mL) pen Inject 20 Units under the skin once daily at bedtime. (Patient taking differently: Inject 16 Units under the skin once daily at bedtime.) 30 mL 1    lancets (OneTouch Delica Plus Lancet) 33 gauge misc Use as directed to test blood sugars up to 5 times a day 100 each 0    lisinopril 40 mg tablet Take 1 tablet (40 mg) by mouth once daily. 90 tablet 1    LORazepam (Ativan) 0.5 mg tablet Take 1 to 2 tablets (0.5mg to 1mg) 30 minutes prior to flight 4 tablet 0    meclizine (Antivert) 25 mg tablet Take 0.5 tablets (12.5 mg) by mouth 3 times a day as needed for dizziness. 30 tablet 11    metFORMIN (Glucophage) 500 mg tablet Take 2 tablets (1,000 mg) by mouth 2 times a day. (Patient taking differently: Take 1 tablet (500 mg) by mouth once daily at bedtime.) 360 tablet 3    metoclopramide (Reglan) 5 mg tablet Take 1 tablet (5 mg) by mouth 4 times a day. 360 tablet 1    OneTouch Delica Plus Lancet 33 gauge misc 1 Lancet by Does not apply route 3 times a day. 100 each 11    OneTouch Verio test strips strip 1 strip by Does not apply route 3 times a day. 100 strip 11    oxybutynin XL (Ditropan-XL) 10 mg 24 hr tablet Take 1 tablet " (10 mg) by mouth once daily at bedtime. Do not crush, chew, or split. 90 tablet 1    oxybutynin XL (Ditropan-XL) 10 mg 24 hr tablet TAKE ONE TABLET BY MOUTH EVERY DAY AT BEDTIME 90 tablet 0     No current facility-administered medications on file prior to visit.        Assessment/Plan   Problem List Items Addressed This Visit             ICD-10-CM    Diabetes mellitus (CMS/[facility]) E11.9     Assessment  Patient has been doing well with the [facility] C[facility].  Patient BG is stable with 7 days average BG-116 mg/dL and 14 days average -114 mg/dL (Target glucose range  mg/dL). A1c is 5.5% (goal <7%).   Patient had few hypoglycemia events occurred in the middle of the night with the Tyshawn reading alarm going off. Patient and caregiver are aware of how to manage hypoglycemia.     Plan  Continue to take medications as prescribed by your provider.  Continue to monitor your BG daily.   Encouraged patient to be more consistent on adding protein and complex carbs in her diet for dinner.   Follow up with patient in 3 weeks.             FELIPE CATES CPhT  PGY-1 Resident    Continue all meds under the continuation of care with the referring provider and clinical pharmacy team.

## 2023-11-15 ENCOUNTER — TELEMEDICINE (OUTPATIENT)
Dept: PRIMARY CARE | Facility: CLINIC | Age: 68
End: 2023-11-15
Payer: MEDICARE

## 2023-11-15 ENCOUNTER — TELEMEDICINE (OUTPATIENT)
Dept: PHARMACY | Facility: HOSPITAL | Age: 68
End: 2023-11-15
Payer: MEDICARE

## 2023-11-15 DIAGNOSIS — U07.1 COVID: Primary | ICD-10-CM

## 2023-11-15 DIAGNOSIS — U07.1 COVID: ICD-10-CM

## 2023-11-15 DIAGNOSIS — U07.1 COVID-19: Primary | ICD-10-CM

## 2023-11-15 PROCEDURE — 99214 OFFICE O/P EST MOD 30 MIN: CPT | Performed by: FAMILY MEDICINE

## 2023-11-15 RX ORDER — FLASH GLUCOSE SCANNING READER
EACH MISCELLANEOUS
COMMUNITY
Start: 2023-10-04

## 2023-11-15 RX ORDER — FLASH GLUCOSE SENSOR
KIT MISCELLANEOUS
COMMUNITY
Start: 2023-10-04

## 2023-11-15 ASSESSMENT — ENCOUNTER SYMPTOMS
FATIGUE: 0
DIZZINESS: 0
COLOR CHANGE: 0
WHEEZING: 1
APPETITE CHANGE: 0
FEVER: 0
CHEST TIGHTNESS: 0
PALPITATIONS: 0
LIGHT-HEADEDNESS: 0
CHOKING: 0
NAUSEA: 1
ACTIVITY CHANGE: 0
VOMITING: 1
COUGH: 1
BACK PAIN: 0
FACIAL ASYMMETRY: 0
HEADACHES: 0
SORE THROAT: 1
ARTHRALGIAS: 0

## 2023-11-15 NOTE — PROGRESS NOTES
Subjective   Patient ID: Glenna Delgado is a 68 y.o. female who presents for Positive for covid today.  (Headache, body aches, wheezing, sore throat. X Sunday. ).    HPI   Patient reports that she was positive for covid today. She explains that she has had headache body aches wheezing and sore throat since Sunday.  Has had 1 episode of vomiting and nausea.  Review of Systems   Constitutional:  Negative for activity change, appetite change, fatigue and fever.   HENT:  Positive for sore throat. Negative for congestion.    Respiratory:  Positive for cough and wheezing. Negative for choking and chest tightness.    Cardiovascular:  Negative for chest pain, palpitations and leg swelling.   Gastrointestinal:  Positive for nausea and vomiting.   Musculoskeletal:  Negative for arthralgias, back pain and gait problem.   Skin:  Negative for color change and pallor.   Neurological:  Negative for dizziness, facial asymmetry, light-headedness and headaches.       Objective   There were no vitals taken for this visit.  BSA There is no height or weight on file to calculate BSA.      Physical Exam  Constitutional:       Appearance: Normal appearance.   Neurological:      Mental Status: She is alert.       No visits with results within 1 Year(s) from this visit.   Latest known visit with results is:   Legacy Encounter on 12/02/2021   Component Date Value Ref Range Status    Glucose 12/02/2021 183 (H)  74 - 99 mg/dL Final    Sodium 12/02/2021 138  136 - 145 mmol/L Final    Potassium 12/02/2021 4.4  3.5 - 5.3 mmol/L Final    Chloride 12/02/2021 101  98 - 107 mmol/L Final    Bicarbonate 12/02/2021 27  21 - 32 mmol/L Final    Anion Gap 12/02/2021 14  10 - 20 mmol/L Final    Urea Nitrogen 12/02/2021 19  6 - 23 mg/dL Final    Creatinine 12/02/2021 0.91  0.50 - 1.05 mg/dL Final    GLOMERULAR FILTRATION RATE-NON AFR* 12/02/2021 >60  >60 mL/min/1.73m2 Final    GLOMERULAR FILTRATION RATE-* 12/02/2021 >60  >60 mL/min/1.73m2 Final     Comment:  CALCULATIONS OF ESTIMATED GFR ARE PERFORMED   USING THE MDRD STUDY EQUATION FOR THE   IDMS-TRACEABLE CREATININE METHODS.   CLIN CHEM 2007;53:766-72      Calcium 12/02/2021 9.8  8.6 - 10.6 mg/dL Final    Albumin 12/02/2021 4.0  3.4 - 5.0 g/dL Final    Alkaline Phosphatase 12/02/2021 129  33 - 136 U/L Final    Total Protein 12/02/2021 6.8  6.4 - 8.2 g/dL Final    AST 12/02/2021 10  9 - 39 U/L Final    Total Bilirubin 12/02/2021 0.3  0.0 - 1.2 mg/dL Final    ALT (SGPT) 12/02/2021 11  7 - 45 U/L Final    Comment:  Patients treated with Sulfasalazine may generate    falsely decreased results for ALT.      Cholesterol 12/02/2021 124  0 - 199 mg/dL Final    Comment: .      AGE      DESIRABLE   BORDERLINE HIGH   HIGH     0-19 Y     0 - 169       170 - 199     >/= 200    20-24 Y     0 - 189       190 - 224     >/= 225         >24 Y     0 - 199       200 - 239     >/= 240   **All ranges are based on fasting samples. Specific   therapeutic targets will vary based on patient-specific   cardiac risk.  .   Pediatric guidelines reference:Pediatrics 2011, 128(S5).   Adult guidelines reference: NCEP ATPIII Guidelines,     KAREEM 2001, 258:2486-97  .   Venipuncture immediately after or during the    administration of Metamizole may lead to falsely   low results. Testing should be performed immediately   prior to Metamizole dosing.      HDL 12/02/2021 35.4 (A)  mg/dL Final    Comment: .      AGE      VERY LOW   LOW     NORMAL    HIGH       0-19 Y       < 35   < 40     40-45     ----    20-24 Y       ----   < 40       >45     ----      >24 Y       ----   < 40     40-60      >60  .      Cholesterol/HDL Ratio 12/02/2021 3.5   Final    Comment: REF VALUES  DESIRABLE  < 3.4  HIGH RISK  > 5.0      LDL 12/02/2021 48  0 - 99 mg/dL Final    Comment: .                           NEAR      BORD      AGE      DESIRABLE  OPTIMAL    HIGH     HIGH     VERY HIGH     0-19 Y     0 - 109     ---    110-129   >/= 130     ----    20-24 Y     0 -  119     ---    120-159   >/= 160     ----      >24 Y     0 -  99   100-129  130-159   160-189     >/=190  .      VLDL 12/02/2021 41 (H)  0 - 40 mg/dL Final    Triglycerides 12/02/2021 205 (H)  0 - 149 mg/dL Final    Comment: .      AGE      DESIRABLE   BORDERLINE HIGH   HIGH     VERY HIGH   0 D-90 D    19 - 174         ----         ----        ----  91 D- 9 Y     0 -  74        75 -  99     >/= 100      ----    10-19 Y     0 -  89        90 - 129     >/= 130      ----    20-24 Y     0 - 114       115 - 149     >/= 150      ----         >24 Y     0 - 149       150 - 199    200- 499    >/= 500  .   Venipuncture immediately after or during the    administration of Metamizole may lead to falsely   low results. Testing should be performed immediately   prior to Metamizole dosing.      Non HDL Cholesterol 12/02/2021 89  mg/dL Final    Comment:     AGE      DESIRABLE   BORDERLINE HIGH   HIGH     VERY HIGH     0-19 Y     0 - 119       120 - 144     >/= 145    >/= 160    20-24 Y     0 - 149       150 - 189     >/= 190      ----         >24 Y    30 MG/DL ABOVE LDL CHOLESTEROL GOAL  .      Hemoglobin A1C 12/02/2021 6.7 (A)  % Final    Comment:      Diagnosis of Diabetes-Adults   Non-Diabetic: < or = 5.6%   Increased risk for developing diabetes: 5.7-6.4%   Diagnostic of diabetes: > or = 6.5%  .       Monitoring of Diabetes                Age (y)     Therapeutic Goal (%)   Adults:          >18           <7.0   Pediatrics:    13-18           <7.5                   7-12           <8.0                   0- 6            7.5-8.5   American Diabetes Association. Diabetes Care 33(S1), Jan 2010.      Estimated Average Glucose 12/02/2021 146  MG/DL Final     Current Outpatient Medications on File Prior to Visit   Medication Sig Dispense Refill    aspirin 81 mg EC tablet Take 1 tablet (81 mg) by mouth once daily. 30 tablet 11    atorvastatin (Lipitor) 40 mg tablet Take 1 tablet (40 mg) by mouth once daily at bedtime. 90 tablet 1    BD  "Conchita 2nd Gen Pen Needle 32 gauge x 5/32\" needle USE 5 A DAY WITH INSULIN INJECTIONS      buPROPion SR (Wellbutrin SR) 150 mg 12 hr tablet Take 1 tablet (150 mg) by mouth once daily. Do not crush, chew, or split. Take with 300 mg tablet for total dose 450 mg 30 tablet 5    buPROPion XL (Wellbutrin XL) 300 mg 24 hr tablet Take 1 tablet (300 mg) by mouth once daily. Do not crush, chew, or split. 90 tablet 1    clopidogrel (Plavix) 75 mg tablet Take 1 tablet (75 mg) by mouth once daily. 90 tablet 1    DULoxetine (Cymbalta) 60 mg DR capsule Take 1 capsule (60 mg) by mouth once daily. Do not crush or chew. 90 capsule 1    FreeStyle Tyshawn 2 [place] misc USE as directed TO monitor blood glucose with Tyshawn Sensor      FreeStyle Tyshawn 2 Sensor kit USE as directed TO monitor blood glucose. replace every 14 DAYS.      glucose 4 gram chewable tablet Chew 4 tablets (16 g) if needed.      insulin glargine (Lantus Solostar U-100 Insulin) 100 unit/mL (3 mL) pen Inject 20 Units under the skin once daily at bedtime. (Patient taking differently: Inject 16 Units under the skin once daily at bedtime.) 30 mL 1    lancets (OneTouch Delica Plus Lancet) 33 gauge misc Use as directed to test blood sugars up to 5 times a day 100 each 0    lisinopril 40 mg tablet Take 1 tablet (40 mg) by mouth once daily. 90 tablet 1    LORazepam (Ativan) 0.5 mg tablet Take 1 to 2 tablets (0.5mg to 1mg) 30 minutes prior to flight 4 tablet 0    meclizine (Antivert) 25 mg tablet Take 0.5 tablets (12.5 mg) by mouth 3 times a day as needed for dizziness. 30 tablet 11    metFORMIN (Glucophage) 500 mg tablet Take 2 tablets (1,000 mg) by mouth 2 times a day. (Patient taking differently: Take 1 tablet (500 mg) by mouth once daily at bedtime.) 360 tablet 3    metoclopramide (Reglan) 5 mg tablet Take 1 tablet (5 mg) by mouth 4 times a day. 360 tablet 1    OneTouch Delica Plus Lancet 33 gauge misc 1 Lancet by Does not apply route 3 times a day. 100 each 11    OneTouch " Verio test strips strip 1 strip by Does not apply route 3 times a day. 100 strip 11    oxybutynin XL (Ditropan-XL) 10 mg 24 hr tablet Take 1 tablet (10 mg) by mouth once daily at bedtime. Do not crush, chew, or split. 90 tablet 1    [DISCONTINUED] oxybutynin XL (Ditropan-XL) 10 mg 24 hr tablet TAKE ONE TABLET BY MOUTH EVERY DAY AT BEDTIME (Patient not taking: Reported on 11/15/2023) 90 tablet 0     No current facility-administered medications on file prior to visit.     No images are attached to the encounter.            Assessment/Plan   Diagnoses and all orders for this visit:  COVID-19  1.  We will have discussion with pharmacist regarding potential paxlovid  2.  Patient to call for questions or concerns

## 2023-11-15 NOTE — PROGRESS NOTES
Patient had flu like symptoms start Sunday night, tested positive this morning for COVID. Vaccinated. Not a candidate for Paxlovid due to several drug interactions including benzo and clopidogrel. Would be good candidate for molnupiravir 800 mg BID x 5 days. Spoke to patient's  Alessandro. Instructed Alessandro how patient should take medications, duration, side effects and expectations. Patient to go to ER if feeling worse. No questions or concerns at this time. RX for Molnupiravir 200 mg --- 4 caps BID x 5 days #40, 0 RF sent to [email or web address].    Thanks,  Melvin Flores, PharmD

## 2023-11-17 DIAGNOSIS — Z79.4 TYPE 2 DIABETES MELLITUS WITH OTHER CIRCULATORY COMPLICATION, WITH LONG-TERM CURRENT USE OF INSULIN (MULTI): ICD-10-CM

## 2023-11-17 DIAGNOSIS — E11.59 TYPE 2 DIABETES MELLITUS WITH OTHER CIRCULATORY COMPLICATION, WITH LONG-TERM CURRENT USE OF INSULIN (MULTI): ICD-10-CM

## 2023-11-17 RX ORDER — METFORMIN HYDROCHLORIDE 500 MG/1
1000 TABLET ORAL 2 TIMES DAILY
Qty: 360 TABLET | Refills: 3 | Status: SHIPPED | OUTPATIENT
Start: 2023-11-17 | End: 2024-11-16

## 2023-12-12 ENCOUNTER — CLINICAL SUPPORT (OUTPATIENT)
Dept: PRIMARY CARE | Facility: CLINIC | Age: 68
End: 2023-12-12
Payer: MEDICARE

## 2023-12-12 ENCOUNTER — OFFICE VISIT (OUTPATIENT)
Dept: PRIMARY CARE | Facility: CLINIC | Age: 68
End: 2023-12-12
Payer: MEDICARE

## 2023-12-12 VITALS
HEART RATE: 87 BPM | SYSTOLIC BLOOD PRESSURE: 128 MMHG | DIASTOLIC BLOOD PRESSURE: 70 MMHG | HEIGHT: 62 IN | BODY MASS INDEX: 39.45 KG/M2 | WEIGHT: 214.4 LBS

## 2023-12-12 DIAGNOSIS — E78.00 PURE HYPERCHOLESTEROLEMIA: ICD-10-CM

## 2023-12-12 DIAGNOSIS — I73.89 OTHER SPECIFIED PERIPHERAL VASCULAR DISEASES (CMS-HCC): ICD-10-CM

## 2023-12-12 DIAGNOSIS — E55.9 VITAMIN D DEFICIENCY: ICD-10-CM

## 2023-12-12 DIAGNOSIS — Z78.0 ASYMPTOMATIC MENOPAUSAL STATE: ICD-10-CM

## 2023-12-12 DIAGNOSIS — E66.01 OBESITY, MORBID (MULTI): ICD-10-CM

## 2023-12-12 DIAGNOSIS — Z79.4 TYPE 2 DIABETES MELLITUS WITH OTHER CIRCULATORY COMPLICATION, WITH LONG-TERM CURRENT USE OF INSULIN (MULTI): ICD-10-CM

## 2023-12-12 DIAGNOSIS — G47.33 OSA ON CPAP: ICD-10-CM

## 2023-12-12 DIAGNOSIS — Z13.89 ENCOUNTER FOR SCREENING FOR OTHER DISORDER: ICD-10-CM

## 2023-12-12 DIAGNOSIS — Z79.4 TYPE 2 DIABETES MELLITUS WITH OTHER CIRCULATORY COMPLICATION, WITH LONG-TERM CURRENT USE OF INSULIN (MULTI): Primary | ICD-10-CM

## 2023-12-12 DIAGNOSIS — E11.59 TYPE 2 DIABETES MELLITUS WITH OTHER CIRCULATORY COMPLICATION, WITH LONG-TERM CURRENT USE OF INSULIN (MULTI): Primary | ICD-10-CM

## 2023-12-12 DIAGNOSIS — F32.9 REACTIVE DEPRESSION: ICD-10-CM

## 2023-12-12 DIAGNOSIS — F41.9 ANXIETY: ICD-10-CM

## 2023-12-12 DIAGNOSIS — Z00.00 HEALTHCARE MAINTENANCE: ICD-10-CM

## 2023-12-12 DIAGNOSIS — Z00.00 ROUTINE GENERAL MEDICAL EXAMINATION AT HEALTH CARE FACILITY: Primary | ICD-10-CM

## 2023-12-12 DIAGNOSIS — I10 PRIMARY HYPERTENSION: ICD-10-CM

## 2023-12-12 DIAGNOSIS — C50.919 MALIGNANT NEOPLASM OF FEMALE BREAST, UNSPECIFIED ESTROGEN RECEPTOR STATUS, UNSPECIFIED LATERALITY, UNSPECIFIED SITE OF BREAST (MULTI): ICD-10-CM

## 2023-12-12 DIAGNOSIS — Z71.89 CARDIAC RISK COUNSELING: ICD-10-CM

## 2023-12-12 DIAGNOSIS — Z71.89 ADVANCE DIRECTIVE DISCUSSED WITH PATIENT: ICD-10-CM

## 2023-12-12 DIAGNOSIS — E11.59 TYPE 2 DIABETES MELLITUS WITH OTHER CIRCULATORY COMPLICATION, WITH LONG-TERM CURRENT USE OF INSULIN (MULTI): ICD-10-CM

## 2023-12-12 PROBLEM — M46.20 OSTEOMYELITIS OF SPINE (MULTI): Status: RESOLVED | Noted: 2023-03-24 | Resolved: 2023-12-12

## 2023-12-12 PROBLEM — R55 SYNCOPE AND COLLAPSE: Status: RESOLVED | Noted: 2023-07-09 | Resolved: 2023-12-12

## 2023-12-12 PROCEDURE — 99214 OFFICE O/P EST MOD 30 MIN: CPT | Performed by: FAMILY MEDICINE

## 2023-12-12 PROCEDURE — 1036F TOBACCO NON-USER: CPT | Performed by: FAMILY MEDICINE

## 2023-12-12 PROCEDURE — 1160F RVW MEDS BY RX/DR IN RCRD: CPT | Performed by: FAMILY MEDICINE

## 2023-12-12 PROCEDURE — 1170F FXNL STATUS ASSESSED: CPT | Performed by: FAMILY MEDICINE

## 2023-12-12 PROCEDURE — G0439 PPPS, SUBSEQ VISIT: HCPCS | Performed by: FAMILY MEDICINE

## 2023-12-12 PROCEDURE — 3074F SYST BP LT 130 MM HG: CPT | Performed by: FAMILY MEDICINE

## 2023-12-12 PROCEDURE — 1159F MED LIST DOCD IN RCRD: CPT | Performed by: FAMILY MEDICINE

## 2023-12-12 PROCEDURE — 99497 ADVNCD CARE PLAN 30 MIN: CPT | Performed by: FAMILY MEDICINE

## 2023-12-12 PROCEDURE — 3078F DIAST BP <80 MM HG: CPT | Performed by: FAMILY MEDICINE

## 2023-12-12 PROCEDURE — 1158F ADVNC CARE PLAN TLK DOCD: CPT | Performed by: FAMILY MEDICINE

## 2023-12-12 PROCEDURE — G0446 INTENS BEHAVE THER CARDIO DX: HCPCS | Performed by: FAMILY MEDICINE

## 2023-12-12 PROCEDURE — 4010F ACE/ARB THERAPY RXD/TAKEN: CPT | Performed by: FAMILY MEDICINE

## 2023-12-12 PROCEDURE — G0444 DEPRESSION SCREEN ANNUAL: HCPCS | Performed by: FAMILY MEDICINE

## 2023-12-12 PROCEDURE — 99397 PER PM REEVAL EST PAT 65+ YR: CPT | Performed by: FAMILY MEDICINE

## 2023-12-12 RX ORDER — INSULIN GLARGINE 100 [IU]/ML
12 INJECTION, SOLUTION SUBCUTANEOUS NIGHTLY
Qty: 30 ML | Refills: 1
Start: 2023-12-12 | End: 2024-02-29 | Stop reason: SDUPTHER

## 2023-12-12 ASSESSMENT — ENCOUNTER SYMPTOMS
ARTHRALGIAS: 0
COLOR CHANGE: 0
DIZZINESS: 0
LOSS OF SENSATION IN FEET: 1
COUGH: 0
BACK PAIN: 0
FACIAL ASYMMETRY: 0
FATIGUE: 0
APPETITE CHANGE: 0
DEPRESSION: 0
PALPITATIONS: 0
CHEST TIGHTNESS: 0
FEVER: 0
LIGHT-HEADEDNESS: 0
CHOKING: 0
OCCASIONAL FEELINGS OF UNSTEADINESS: 0
HEADACHES: 0
ACTIVITY CHANGE: 0

## 2023-12-12 ASSESSMENT — PATIENT HEALTH QUESTIONNAIRE - PHQ9
1. LITTLE INTEREST OR PLEASURE IN DOING THINGS: NOT AT ALL
2. FEELING DOWN, DEPRESSED OR HOPELESS: NOT AT ALL
SUM OF ALL RESPONSES TO PHQ9 QUESTIONS 1 AND 2: 0

## 2023-12-12 ASSESSMENT — ACTIVITIES OF DAILY LIVING (ADL)
BATHING: NEEDS ASSISTANCE
DRESSING: NEEDS ASSISTANCE
GROCERY_SHOPPING: NEEDS ASSISTANCE
MANAGING_FINANCES: NEEDS ASSISTANCE
DOING_HOUSEWORK: NEEDS ASSISTANCE
TAKING_MEDICATION: INDEPENDENT

## 2023-12-12 NOTE — ACP (ADVANCE CARE PLANNING)
Confirming Previous Code Status:   Advance Care Planning Note     Discussion Date: 12/12/23   Discussion Participants: patient    The patient wishes to discuss Advance Care Planning today and the following is a brief summary of our discussion.     Patient has capacity to make their own medical decisions: Yes  Health Care Agent/Surrogate Decision Maker documented in chart: Yes    Documents on file and valid:  Advance Directive/Living Will: Yes   Health Care Power of : Yes  Other:     Communication of Medical Status/Prognosis:   stable    Communication of Treatment Goals/Options:   stable    Treatment Decisions  Goals of Care: survival is prioritized, if goals for quality or survival can reasonably be achieved     Follow Up Plan  stable  Team Members  stable  Time Statement: Total face to face time spent on advance care planning was 5 minutes with 16 minutes spent in counseling, including the explanation.    Marleen Llanes DO  12/12/2023 3:29 PM

## 2023-12-12 NOTE — PROGRESS NOTES
Subjective   Reason for Visit: Glenna Delgado is an 68 y.o. female here for a Medicare Wellness visit.     Past Medical, Surgical, and Family History reviewed and updated in chart.    Reviewed all medications by prescribing practitioner or clinical pharmacist (such as prescriptions, OTCs, herbal therapies and supplements) and documented in the medical record.    HPI  Patient presents for physical exam.      Fam Hx  Mom (91) living, DMII, HTN, Hypothyroidism, lymphoma  Dad (84) , celiac disease  Brother () living, arthritis     Exercise walks  ETOH denies  Caffeine 20 oz diet Dr. Pepper/day  Tobacco denies     breast surgeon, Dr.. Francis Patel  ortho for knee, Dr. Delgado     Mammogram negative   DEXA due   Colonoscopy refuses     Patient denies other complaints.   Patient Self Assessment of Health Status  Patient Self Assessment: Fair    Nutrition and Exercise  Current Diet: Diabetic Diet  Adequate Fluid Intake: No  Caffeine: Yes  Exercise Frequency: No Exercise    Functional Ability/Level of Safety  Cognitive Impairment Observed: No cognitive impairment observed  Cognitive Impairment Reported: No cognitive impairment reported by patient or family    Home Safety Risk Factors: None    Patient Care Team:  Marleen Llanes DO as PCP - General  Marleen Llanes DO as PCP - [employer] PCP  Jami Akers RN as Care Manager (Case Management)     Review of Systems   Constitutional:  Negative for activity change, appetite change, fatigue and fever.   HENT:  Negative for congestion.    Respiratory:  Negative for cough, choking and chest tightness.    Cardiovascular:  Negative for chest pain, palpitations and leg swelling.   Musculoskeletal:  Negative for arthralgias, back pain and gait problem.   Skin:  Negative for color change and pallor.   Neurological:  Negative for dizziness, facial asymmetry, light-headedness and headaches.       Objective   Vitals:  /70 (BP Location: Left arm)   " Pulse 87   Ht 1.575 m (5' 2\")   Wt 97.3 kg (214 lb 6.4 oz)   BMI 39.21 kg/m²       Physical Exam  Constitutional:       General: She is not in acute distress.     Appearance: Normal appearance. She is not toxic-appearing.   HENT:      Head: Normocephalic.      Right Ear: Tympanic membrane, ear canal and external ear normal.      Left Ear: Tympanic membrane, ear canal and external ear normal.      Nose: Nose normal.      Mouth/Throat:      Pharynx: Oropharynx is clear.   Eyes:      Conjunctiva/sclera: Conjunctivae normal.      Pupils: Pupils are equal, round, and reactive to light.   Cardiovascular:      Rate and Rhythm: Normal rate and regular rhythm.      Pulses: Normal pulses.      Heart sounds: Normal heart sounds.   Pulmonary:      Effort: No respiratory distress.      Breath sounds: No wheezing, rhonchi or rales.   Abdominal:      General: Bowel sounds are normal. There is no distension.      Palpations: Abdomen is soft.      Tenderness: There is no abdominal tenderness.   Musculoskeletal:         General: No swelling or tenderness.      Cervical back: No tenderness.   Skin:     Findings: No lesion or rash.   Neurological:      General: No focal deficit present.      Mental Status: She is alert and oriented to person, place, and time. Mental status is at baseline.      Gait: Gait normal.   Psychiatric:         Mood and Affect: Mood normal.         Behavior: Behavior normal.         Thought Content: Thought content normal.         Judgment: Judgment normal.         Assessment/Plan   Problem List Items Addressed This Visit    None  Visit Diagnoses       Routine general medical examination at health care facility    -  Primary    Relevant Orders    1 Year Follow Up In [facility] - PCP - Wellness Exam    Healthcare maintenance        Asymptomatic menopausal state        Relevant Orders    XR DEXA bone density          1. Patient's blood work available at this office visit  2. Patient's LDL " unavailable, goal LDL <70 secondary to diabetes, continue on TYPE II diet  3. TG unavailable, goal TG <150, continue TYPE IV diet  4. HgbA1c 5.5, goal HgbA1c <6.5  5. Vitamin d level unavailable, goal vitamin d >30, continue on vitamin d3 5,000 IU daily for lifetime  6. Mammogram negative 2023  7. DEXA due 2023  8. Colonoscopy refuses  9. Patient to call if questions or concerns

## 2023-12-12 NOTE — PROGRESS NOTES
"Subjective   Patient ID: Glenna Delgado is a 68 y.o. female who presents for Diabetes.    Referring Provider: Marleen Llanes,      Diabetes  She presents for her follow-up diabetic visit. She has type 2 diabetes mellitus. Her disease course has been worsening (several hypoglycemic episodes).   Spoke to patient's  who stated that patient has been doing well with the Freestyle Tyshawn CGM however has had multiple hypoglycemic episodes in the middle of the night from 2am to morning. Episodes are not symptomatic however patient's tyshawn alarms do go off. LibreView report for the past 14 days shows 89% TIR, 1% above, 10% below. He stated that the lowest BG level they got is 53 mg/dL and usually will go back to normal BG level once she takes 15 grams of carbs. There were times the reader alarm went off during the night. 30 day data: 1% above, 94% TIR, 5% below. 90 Day: 2% above, 94% TIR, 4% below.     Review of Systems    Objective     There were no vitals taken for this visit.     Labs  Lab Results   Component Value Date    BILITOT 0.3 12/02/2021    CALCIUM 9.8 12/02/2021    CO2 27 12/02/2021     12/02/2021    CREATININE 0.91 12/02/2021    GLUCOSE 183 (H) 12/02/2021    ALKPHOS 129 12/02/2021    K 4.4 12/02/2021    PROT 6.8 12/02/2021     12/02/2021    AST 10 12/02/2021    ALT 11 12/02/2021    BUN 19 12/02/2021    ANIONGAP 14 12/02/2021    ALBUMIN 4.0 12/02/2021     Lab Results   Component Value Date    TRIG 205 (H) 12/02/2021    CHOL 124 12/02/2021    HDL 35.4 (A) 12/02/2021     Lab Results   Component Value Date    HGBA1C 5.5 07/16/2023       Current Outpatient Medications on File Prior to Visit   Medication Sig Dispense Refill    aspirin 81 mg EC tablet Take 1 tablet (81 mg) by mouth once daily. 30 tablet 11    atorvastatin (Lipitor) 40 mg tablet Take 1 tablet (40 mg) by mouth once daily at bedtime. 90 tablet 1    BD Conchita 2nd Gen Pen Needle 32 gauge x 5/32\" needle USE 5 A DAY WITH INSULIN INJECTIONS  "     buPROPion SR (Wellbutrin SR) 150 mg 12 hr tablet Take 1 tablet (150 mg) by mouth once daily. Do not crush, chew, or split. Take with 300 mg tablet for total dose 450 mg 30 tablet 5    buPROPion XL (Wellbutrin XL) 300 mg 24 hr tablet Take 1 tablet (300 mg) by mouth once daily. Do not crush, chew, or split. 90 tablet 1    clopidogrel (Plavix) 75 mg tablet Take 1 tablet (75 mg) by mouth once daily. 90 tablet 1    DULoxetine (Cymbalta) 60 mg DR capsule Take 1 capsule (60 mg) by mouth once daily. Do not crush or chew. 90 capsule 1    FreeStyle Tyshawn 2 [place] misc USE as directed TO monitor blood glucose with Tyshawn Sensor      FreeStyle Tyshawn 2 Sensor kit USE as directed TO monitor blood glucose. replace every 14 DAYS.      glucose 4 gram chewable tablet Chew 4 tablets (16 g) if needed.      insulin glargine (Lantus Solostar U-100 Insulin) 100 unit/mL (3 mL) pen Inject 20 Units under the skin once daily at bedtime. (Patient taking differently: Inject 16 Units under the skin once daily at bedtime.) 30 mL 1    lancets (OneTouch Delica Plus Lancet) 33 gauge misc Use as directed to test blood sugars up to 5 times a day 100 each 0    lisinopril 40 mg tablet Take 1 tablet (40 mg) by mouth once daily. 90 tablet 1    LORazepam (Ativan) 0.5 mg tablet Take 1 to 2 tablets (0.5mg to 1mg) 30 minutes prior to flight 4 tablet 0    meclizine (Antivert) 25 mg tablet Take 0.5 tablets (12.5 mg) by mouth 3 times a day as needed for dizziness. 30 tablet 11    metFORMIN (Glucophage) 500 mg tablet Take 2 tablets (1,000 mg) by mouth 2 times a day. 360 tablet 3    metoclopramide (Reglan) 5 mg tablet Take 1 tablet (5 mg) by mouth 4 times a day. 360 tablet 1    molnupiravir 200 mg capsule Take 4 capsules (800 mg) by mouth 2 times a day. 40 capsule 0    OneTouch Delica Plus Lancet 33 gauge misc 1 Lancet by Does not apply route 3 times a day. 100 each 11    OneTouch Verio test strips strip 1 strip by Does not apply route 3 times a day. 100 strip  11    oxybutynin XL (Ditropan-XL) 10 mg 24 hr tablet Take 1 tablet (10 mg) by mouth once daily at bedtime. Do not crush, chew, or split. 90 tablet 1     No current facility-administered medications on file prior to visit.        Assessment/Plan   Problem List Items Addressed This Visit             ICD-10-CM    Diabetes mellitus (CMS/[facility]) E11.9       Assessment  Patient has been doing well with the [employer] Tyshawn CGM.  Patient had several hypoglycemia events occurred in the middle of the night with the Tyshawn reading alarm going off. Patient and caregiver are aware of how to manage hypoglycemia.      Plan  Decrease Lantus to 12 units nightly   Continue to monitor your BG daily.   Encouraged patient to be more consistent on adding protein and complex carbs in her diet for dinner.   Follow up with patient in 3 weeks.      Melvin Flores, PharmD    Continue all meds under the continuation of care with the referring provider and clinical pharmacy team.

## 2024-01-17 DIAGNOSIS — E11.59 TYPE 2 DIABETES MELLITUS WITH OTHER CIRCULATORY COMPLICATION, WITH LONG-TERM CURRENT USE OF INSULIN (MULTI): Primary | ICD-10-CM

## 2024-01-17 DIAGNOSIS — Z79.4 TYPE 2 DIABETES MELLITUS WITH OTHER CIRCULATORY COMPLICATION, WITH LONG-TERM CURRENT USE OF INSULIN (MULTI): Primary | ICD-10-CM

## 2024-01-17 RX ORDER — PEN NEEDLE, DIABETIC 32GX 5/32"
NEEDLE, DISPOSABLE MISCELLANEOUS
Qty: 30 EACH | Refills: 0 | Status: SHIPPED | OUTPATIENT
Start: 2024-01-17 | End: 2024-04-12 | Stop reason: SDUPTHER

## 2024-01-18 DIAGNOSIS — I63.9 CEREBROVASCULAR ACCIDENT (CVA), UNSPECIFIED MECHANISM (MULTI): ICD-10-CM

## 2024-01-18 RX ORDER — CLOPIDOGREL BISULFATE 75 MG/1
75 TABLET ORAL DAILY
Qty: 90 TABLET | Refills: 0 | Status: SHIPPED | OUTPATIENT
Start: 2024-01-18 | End: 2024-05-02 | Stop reason: SDUPTHER

## 2024-02-05 ENCOUNTER — HOSPITAL ENCOUNTER (OUTPATIENT)
Dept: RADIOLOGY | Facility: CLINIC | Age: 69
Discharge: HOME | End: 2024-02-05
Payer: MEDICARE

## 2024-02-05 DIAGNOSIS — Z78.0 ASYMPTOMATIC MENOPAUSAL STATE: ICD-10-CM

## 2024-02-05 PROCEDURE — 77080 DXA BONE DENSITY AXIAL: CPT

## 2024-02-05 PROCEDURE — 77080 DXA BONE DENSITY AXIAL: CPT | Performed by: RADIOLOGY

## 2024-02-19 DIAGNOSIS — R32 URINARY INCONTINENCE, UNSPECIFIED TYPE: ICD-10-CM

## 2024-02-19 RX ORDER — OXYBUTYNIN CHLORIDE 10 MG/1
10 TABLET, EXTENDED RELEASE ORAL NIGHTLY
Qty: 90 TABLET | Refills: 1 | Status: SHIPPED | OUTPATIENT
Start: 2024-02-19

## 2024-02-29 DIAGNOSIS — Z79.4 TYPE 2 DIABETES MELLITUS WITH OTHER CIRCULATORY COMPLICATION, WITH LONG-TERM CURRENT USE OF INSULIN (MULTI): ICD-10-CM

## 2024-02-29 DIAGNOSIS — E11.59 TYPE 2 DIABETES MELLITUS WITH OTHER CIRCULATORY COMPLICATION, WITH LONG-TERM CURRENT USE OF INSULIN (MULTI): ICD-10-CM

## 2024-02-29 RX ORDER — INSULIN GLARGINE 100 [IU]/ML
12 INJECTION, SOLUTION SUBCUTANEOUS NIGHTLY
Qty: 30 ML | Refills: 1 | Status: SHIPPED | OUTPATIENT
Start: 2024-02-29

## 2024-03-05 ENCOUNTER — TELEPHONE (OUTPATIENT)
Dept: PRIMARY CARE | Facility: CLINIC | Age: 69
End: 2024-03-05
Payer: MEDICARE

## 2024-03-05 NOTE — TELEPHONE ENCOUNTER
Please schedule patient for [facility] wellness visit in Dec 2024. Schedule with Dr. Llanes Please send this encounter to Dr. Llanes for lab orders once scheduled.     Thank you-  Maurice Syed CMA  3/5/2024  Practice Supervisor  [facility]

## 2024-03-11 ENCOUNTER — PATIENT OUTREACH (OUTPATIENT)
Dept: PRIMARY CARE | Facility: CLINIC | Age: 69
End: 2024-03-11
Payer: MEDICARE

## 2024-03-11 DIAGNOSIS — Z79.4 TYPE 2 DIABETES MELLITUS WITH OTHER CIRCULATORY COMPLICATION, WITH LONG-TERM CURRENT USE OF INSULIN (MULTI): ICD-10-CM

## 2024-03-11 DIAGNOSIS — E11.59 TYPE 2 DIABETES MELLITUS WITH OTHER CIRCULATORY COMPLICATION, WITH LONG-TERM CURRENT USE OF INSULIN (MULTI): ICD-10-CM

## 2024-03-11 PROCEDURE — 99490 CHRNC CARE MGMT STAFF 1ST 20: CPT | Performed by: FAMILY MEDICINE

## 2024-03-11 NOTE — PROGRESS NOTES
Chart review completed prior to CCM outreach. Call completed to patient. Spoke to . He was not home with patient at the time of my call. He did mention that patient has been having some low blood sugar readings still at night. Not all the time. Lowest was 58. Mostly stays between . Occasionally in the 200's. I advised she try adding in a healthy snack before bedtime to help prevent lows. Something with a complex carb and protein. He stated that she does usually have lows when she hasn't eaten anything or maybe she has had something really sugary. He will let her know about this. He also suggested I call her at home and talk to her. Call placed to home number but had to leave a message.

## 2024-03-18 DIAGNOSIS — E11.59 TYPE 2 DIABETES MELLITUS WITH OTHER CIRCULATORY COMPLICATION, WITH LONG-TERM CURRENT USE OF INSULIN (MULTI): ICD-10-CM

## 2024-03-18 DIAGNOSIS — I10 PRIMARY HYPERTENSION: ICD-10-CM

## 2024-03-18 DIAGNOSIS — E78.00 PURE HYPERCHOLESTEROLEMIA: ICD-10-CM

## 2024-03-18 DIAGNOSIS — Z79.4 TYPE 2 DIABETES MELLITUS WITH OTHER CIRCULATORY COMPLICATION, WITH LONG-TERM CURRENT USE OF INSULIN (MULTI): ICD-10-CM

## 2024-03-18 DIAGNOSIS — E55.9 VITAMIN D DEFICIENCY: ICD-10-CM

## 2024-03-26 DIAGNOSIS — E11.59 TYPE 2 DIABETES MELLITUS WITH OTHER CIRCULATORY COMPLICATION, WITH LONG-TERM CURRENT USE OF INSULIN (MULTI): ICD-10-CM

## 2024-03-26 DIAGNOSIS — F32.9 REACTIVE DEPRESSION: ICD-10-CM

## 2024-03-26 DIAGNOSIS — Z79.4 TYPE 2 DIABETES MELLITUS WITH OTHER CIRCULATORY COMPLICATION, WITH LONG-TERM CURRENT USE OF INSULIN (MULTI): ICD-10-CM

## 2024-03-26 RX ORDER — METOCLOPRAMIDE 5 MG/1
5 TABLET ORAL 4 TIMES DAILY
Qty: 360 TABLET | Refills: 1 | Status: SHIPPED | OUTPATIENT
Start: 2024-03-26

## 2024-03-26 RX ORDER — DULOXETIN HYDROCHLORIDE 60 MG/1
60 CAPSULE, DELAYED RELEASE ORAL DAILY
Qty: 90 CAPSULE | Refills: 1 | Status: SHIPPED | OUTPATIENT
Start: 2024-03-26

## 2024-04-12 DIAGNOSIS — I10 PRIMARY HYPERTENSION: ICD-10-CM

## 2024-04-12 DIAGNOSIS — E11.59 TYPE 2 DIABETES MELLITUS WITH OTHER CIRCULATORY COMPLICATION, WITH LONG-TERM CURRENT USE OF INSULIN (MULTI): ICD-10-CM

## 2024-04-12 DIAGNOSIS — E78.00 PURE HYPERCHOLESTEROLEMIA: Primary | ICD-10-CM

## 2024-04-12 DIAGNOSIS — Z79.4 TYPE 2 DIABETES MELLITUS WITH OTHER CIRCULATORY COMPLICATION, WITH LONG-TERM CURRENT USE OF INSULIN (MULTI): ICD-10-CM

## 2024-04-12 RX ORDER — PEN NEEDLE, DIABETIC 30 GX3/16"
NEEDLE, DISPOSABLE MISCELLANEOUS
Qty: 100 EACH | Refills: 1 | Status: SHIPPED | OUTPATIENT
Start: 2024-04-12

## 2024-04-12 RX ORDER — LISINOPRIL 40 MG/1
40 TABLET ORAL DAILY
Qty: 90 TABLET | Refills: 1 | Status: SHIPPED | OUTPATIENT
Start: 2024-04-12

## 2024-04-12 NOTE — TELEPHONE ENCOUNTER
Patient requesting refills for:    BD pen needles (Conchita 2nd gen)  Lisinopril 40 mg daily    Sent to local     Melvin Flores, SylviaD

## 2024-04-18 ENCOUNTER — PATIENT OUTREACH (OUTPATIENT)
Dept: PRIMARY CARE | Facility: CLINIC | Age: 69
End: 2024-04-18
Payer: MEDICARE

## 2024-04-18 DIAGNOSIS — I10 PRIMARY HYPERTENSION: ICD-10-CM

## 2024-04-18 DIAGNOSIS — Z79.4 TYPE 2 DIABETES MELLITUS WITH OTHER CIRCULATORY COMPLICATION, WITH LONG-TERM CURRENT USE OF INSULIN (MULTI): ICD-10-CM

## 2024-04-18 DIAGNOSIS — E11.59 TYPE 2 DIABETES MELLITUS WITH OTHER CIRCULATORY COMPLICATION, WITH LONG-TERM CURRENT USE OF INSULIN (MULTI): ICD-10-CM

## 2024-04-19 ENCOUNTER — PATIENT OUTREACH (OUTPATIENT)
Dept: PRIMARY CARE | Facility: CLINIC | Age: 69
End: 2024-04-19
Payer: MEDICARE

## 2024-04-19 DIAGNOSIS — I63.9 CEREBROVASCULAR ACCIDENT (CVA), UNSPECIFIED MECHANISM (MULTI): ICD-10-CM

## 2024-04-19 DIAGNOSIS — I10 PRIMARY HYPERTENSION: ICD-10-CM

## 2024-04-19 DIAGNOSIS — Z79.4 TYPE 2 DIABETES MELLITUS WITH OTHER CIRCULATORY COMPLICATION, WITH LONG-TERM CURRENT USE OF INSULIN (MULTI): ICD-10-CM

## 2024-04-19 DIAGNOSIS — E11.59 TYPE 2 DIABETES MELLITUS WITH OTHER CIRCULATORY COMPLICATION, WITH LONG-TERM CURRENT USE OF INSULIN (MULTI): ICD-10-CM

## 2024-04-19 PROCEDURE — 99490 CHRNC CARE MGMT STAFF 1ST 20: CPT | Performed by: FAMILY MEDICINE

## 2024-04-19 PROCEDURE — 99439 CHRNC CARE MGMT STAF EA ADDL: CPT | Performed by: FAMILY MEDICINE

## 2024-04-19 NOTE — PROGRESS NOTES
calls in asking about obtaining resources for some in home care. He states he needs to have surgery in May and patient will need some help with getting up and going to bed and some overall personal care. I discussed that what he is looking for is unfortunately not covered by insurance. It is considered private pay. He could look into Medicaid but that takes months. I offered to gather some resources for him and give him names and numbers of various companies. He is going to call me tomorrow so I can give him that information.

## 2024-04-19 NOTE — PROGRESS NOTES
Call received from patient's . Information collected given to him regarding home care. Gave him the following resources:  [facility] [phone]  [facility] [phone]  [facility] [phone]  [facility] [phone]  [facility] [phone]    Discussed the different questions I would advise him to discuss and what to potentially look for in companies. Patient appreciated the information I gave to him.

## 2024-04-26 DIAGNOSIS — Z79.4 TYPE 2 DIABETES MELLITUS WITHOUT COMPLICATION, WITH LONG-TERM CURRENT USE OF INSULIN (MULTI): ICD-10-CM

## 2024-04-26 DIAGNOSIS — E11.9 TYPE 2 DIABETES MELLITUS WITHOUT COMPLICATION, WITH LONG-TERM CURRENT USE OF INSULIN (MULTI): ICD-10-CM

## 2024-04-26 RX ORDER — ATORVASTATIN CALCIUM 40 MG/1
40 TABLET, FILM COATED ORAL NIGHTLY
Qty: 90 TABLET | Refills: 1 | Status: SHIPPED | OUTPATIENT
Start: 2024-04-26

## 2024-05-02 DIAGNOSIS — I63.9 CEREBROVASCULAR ACCIDENT (CVA), UNSPECIFIED MECHANISM (MULTI): ICD-10-CM

## 2024-05-02 RX ORDER — CLOPIDOGREL BISULFATE 75 MG/1
75 TABLET ORAL DAILY
Qty: 90 TABLET | Refills: 0 | Status: SHIPPED | OUTPATIENT
Start: 2024-05-02

## 2024-06-28 ENCOUNTER — PATIENT OUTREACH (OUTPATIENT)
Dept: PRIMARY CARE | Facility: CLINIC | Age: 69
End: 2024-06-28
Payer: MEDICARE

## 2024-06-28 DIAGNOSIS — I63.9 CEREBROVASCULAR ACCIDENT (CVA), UNSPECIFIED MECHANISM (MULTI): ICD-10-CM

## 2024-06-28 DIAGNOSIS — E11.59 TYPE 2 DIABETES MELLITUS WITH OTHER CIRCULATORY COMPLICATION, WITH LONG-TERM CURRENT USE OF INSULIN (MULTI): ICD-10-CM

## 2024-06-28 DIAGNOSIS — Z79.4 TYPE 2 DIABETES MELLITUS WITH OTHER CIRCULATORY COMPLICATION, WITH LONG-TERM CURRENT USE OF INSULIN (MULTI): ICD-10-CM

## 2024-06-28 NOTE — PROGRESS NOTES
Chart review completed prior to CCM outreach. Call completed to patient's .   Patient doing well. Her  had his surgery and he is coming along. No questions or concerns. No med refills needed. Blood glucose levels are being checked. She did have some spikes into the 200's but there was no rhyme or reason. She is back in the 100-120 range. She does not have her AWV until December. She will need labs for this. She is actually overdue for her Lipid and A1C but we can wait until close to that appt to complete the bw. Everything is going well though. I advised they call me with any questions or concerns.

## 2024-07-30 DIAGNOSIS — I63.9 CEREBROVASCULAR ACCIDENT (CVA), UNSPECIFIED MECHANISM (MULTI): ICD-10-CM

## 2024-07-30 RX ORDER — CLOPIDOGREL BISULFATE 75 MG/1
75 TABLET ORAL DAILY
Qty: 90 TABLET | Refills: 0 | Status: SHIPPED | OUTPATIENT
Start: 2024-07-30

## 2024-08-09 DIAGNOSIS — F40.243 FEAR OF FLYING: ICD-10-CM

## 2024-08-09 DIAGNOSIS — F41.9 ANXIETY: ICD-10-CM

## 2024-08-09 RX ORDER — LORAZEPAM 0.5 MG/1
TABLET ORAL
Qty: 4 TABLET | Refills: 0 | Status: SHIPPED | OUTPATIENT
Start: 2024-08-09

## 2024-08-14 ENCOUNTER — PATIENT OUTREACH (OUTPATIENT)
Dept: PRIMARY CARE | Facility: CLINIC | Age: 69
End: 2024-08-14
Payer: MEDICARE

## 2024-08-14 DIAGNOSIS — Z79.4 TYPE 2 DIABETES MELLITUS WITH OTHER CIRCULATORY COMPLICATION, WITH LONG-TERM CURRENT USE OF INSULIN (MULTI): ICD-10-CM

## 2024-08-14 DIAGNOSIS — I10 PRIMARY HYPERTENSION: ICD-10-CM

## 2024-08-14 DIAGNOSIS — E11.59 TYPE 2 DIABETES MELLITUS WITH OTHER CIRCULATORY COMPLICATION, WITH LONG-TERM CURRENT USE OF INSULIN (MULTI): ICD-10-CM

## 2024-08-14 NOTE — PROGRESS NOTES
Chart review completed prior to CCM outreach. Call attempted to patient. No answer. Left message to call back.     I did see that Dr. Llanes sent in the medication patient requested last week for her fear of flying. She left me a message asking for it. I let her know in my voicemail that this had been sent.

## 2024-08-16 ENCOUNTER — PATIENT OUTREACH (OUTPATIENT)
Dept: PRIMARY CARE | Facility: CLINIC | Age: 69
End: 2024-08-16
Payer: MEDICARE

## 2024-08-16 DIAGNOSIS — I10 PRIMARY HYPERTENSION: ICD-10-CM

## 2024-08-16 DIAGNOSIS — Z79.4 TYPE 2 DIABETES MELLITUS WITH OTHER CIRCULATORY COMPLICATION, WITH LONG-TERM CURRENT USE OF INSULIN (MULTI): ICD-10-CM

## 2024-08-16 DIAGNOSIS — E11.59 TYPE 2 DIABETES MELLITUS WITH OTHER CIRCULATORY COMPLICATION, WITH LONG-TERM CURRENT USE OF INSULIN (MULTI): ICD-10-CM

## 2024-08-16 DIAGNOSIS — R32 URINARY INCONTINENCE, UNSPECIFIED TYPE: ICD-10-CM

## 2024-08-16 RX ORDER — OXYBUTYNIN CHLORIDE 10 MG/1
10 TABLET, EXTENDED RELEASE ORAL NIGHTLY
Qty: 90 TABLET | Refills: 1 | Status: SHIPPED | OUTPATIENT
Start: 2024-08-16

## 2024-08-16 NOTE — PROGRESS NOTES
"Call back received from patient and her .     Patient asked for a refill on her oxybutynin which I sent to PCP to sign. Also I let her know that we did refill her xanax for flying like she had requested. They will be traveling to one of the [facility] to see some family over labor day weekend.     We discussed her glucose levels. She is getting good fasting levels in the low 100's but she is spiking into the 200's after she eats her morning cereal. She was eating raisin bran but then changed to Honey Nut Cheerios and then to Corn Flakes currently. I did try to educate that food is going raise her glucose. We do expect that to a degree. But maybe she could try adding in more protein and watching the added sugar and carbs in the foods she is eating. We reviewed that she is also back to drinking Pepsi daily. Her  states that when she switched to Diet Dr. Pepper her sugars actually got worse than just drinking \"regular high octane Pepsi\" (which might be the Nitrogen Pepsi). Either way I encouraged her to add more water into her diet rather than soda. She is not currently drinking any water and is complaining of headaches. It could have to do with her water intake, her caffeine intake or her BP. They have not checked her BP's in quite some time. I suggested monitoring this at least once a week and when she has headaches. I would like to refer to the pharmacy team to get her actual sugar readings and see if her Lantus may need adjusted. She is currently getting 12 units around dinner time. She does not follow up with PCP until December.     We also discussed that the patient is not being very active. She seems to stay in bed the majority of the day.  states this could be her depression but that she will just need to work herself out of it. I asked if she has seen or talked to a counselor lately. He reports that she did years ago but stopped going. I really suggested that she look back into sessions as " it would help her mental health and physical health to be more active. I did also offer a sooner appt than her current once specifically to discuss the depression, but he did not wish to do that. He states that he did not think it would be helpful for her. He would like to just have her keep her December appt at this time.

## 2024-08-23 DIAGNOSIS — Z79.4 TYPE 2 DIABETES MELLITUS WITH OTHER CIRCULATORY COMPLICATION, WITH LONG-TERM CURRENT USE OF INSULIN (MULTI): ICD-10-CM

## 2024-08-23 DIAGNOSIS — E11.59 TYPE 2 DIABETES MELLITUS WITH OTHER CIRCULATORY COMPLICATION, WITH LONG-TERM CURRENT USE OF INSULIN (MULTI): ICD-10-CM

## 2024-08-23 RX ORDER — INSULIN GLARGINE 100 [IU]/ML
12 INJECTION, SOLUTION SUBCUTANEOUS NIGHTLY
Qty: 30 ML | Refills: 1 | Status: SHIPPED | OUTPATIENT
Start: 2024-08-23

## 2024-08-30 ENCOUNTER — APPOINTMENT (OUTPATIENT)
Dept: PRIMARY CARE | Facility: CLINIC | Age: 69
End: 2024-08-30
Payer: MEDICARE

## 2024-08-30 ENCOUNTER — TELEMEDICINE (OUTPATIENT)
Dept: PRIMARY CARE | Facility: CLINIC | Age: 69
End: 2024-08-30
Payer: MEDICARE

## 2024-08-30 ENCOUNTER — PATIENT OUTREACH (OUTPATIENT)
Dept: PHARMACY | Facility: HOSPITAL | Age: 69
End: 2024-08-30

## 2024-08-30 DIAGNOSIS — U07.1 COVID-19: Primary | ICD-10-CM

## 2024-08-30 PROCEDURE — 1159F MED LIST DOCD IN RCRD: CPT | Performed by: FAMILY MEDICINE

## 2024-08-30 PROCEDURE — 1036F TOBACCO NON-USER: CPT | Performed by: FAMILY MEDICINE

## 2024-08-30 PROCEDURE — 1157F ADVNC CARE PLAN IN RCRD: CPT | Performed by: FAMILY MEDICINE

## 2024-08-30 PROCEDURE — 1160F RVW MEDS BY RX/DR IN RCRD: CPT | Performed by: FAMILY MEDICINE

## 2024-08-30 PROCEDURE — 99214 OFFICE O/P EST MOD 30 MIN: CPT | Performed by: FAMILY MEDICINE

## 2024-08-30 SDOH — HEALTH STABILITY: MENTAL HEALTH: CAFFEINE CONSUMPTION: DAILY

## 2024-08-30 SDOH — HEALTH STABILITY: PHYSICAL HEALTH: EXERCISE LEVEL: INACTIVE

## 2024-08-30 ASSESSMENT — ENCOUNTER SYMPTOMS
DIZZINESS: 0
BACK PAIN: 0
LIGHT-HEADEDNESS: 0
COUGH: 1
APPETITE CHANGE: 0
CHOKING: 0
ACTIVITY CHANGE: 0
FATIGUE: 0
HEADACHES: 0
SHORTNESS OF BREATH: 0
CHEST TIGHTNESS: 0
PALPITATIONS: 0
FACIAL ASYMMETRY: 0
FEVER: 0
COLOR CHANGE: 0
ARTHRALGIAS: 0

## 2024-08-30 NOTE — PROGRESS NOTES
Subjective   Patient ID: Glenna Delgado is a 69 y.o. female who presents for Cough (Sore throat, nasal drainage, headache. X 2 days. Neg for covid today.  pos for covid. ).  I performed this visit using real-time telehealth tools, including an audio/video connection between Glenna Delgado location: [place]  and Marleen Llanes DO location : [place]    HPI   Patient has been having cough, congestion. She has sore throat and swollen face.     Review of Systems   Constitutional:  Negative for activity change, appetite change, fatigue and fever.   HENT:  Positive for congestion.    Respiratory:  Positive for cough. Negative for choking, chest tightness and shortness of breath.    Cardiovascular:  Negative for chest pain, palpitations and leg swelling.   Musculoskeletal:  Negative for arthralgias, back pain and gait problem.   Skin:  Negative for color change and pallor.   Neurological:  Negative for dizziness, facial asymmetry, light-headedness and headaches.       Objective   There were no vitals taken for this visit.  BSA There is no height or weight on file to calculate BSA.      Physical Exam  Constitutional:       Appearance: Normal appearance.   Neurological:      Mental Status: She is alert.       No visits with results within 1 Year(s) from this visit.   Latest known visit with results is:   Legacy Encounter on 12/02/2021   Component Date Value Ref Range Status    Glucose 12/02/2021 183 (H)  74 - 99 mg/dL Final    Sodium 12/02/2021 138  136 - 145 mmol/L Final    Potassium 12/02/2021 4.4  3.5 - 5.3 mmol/L Final    Chloride 12/02/2021 101  98 - 107 mmol/L Final    Bicarbonate 12/02/2021 27  21 - 32 mmol/L Final    Anion Gap 12/02/2021 14  10 - 20 mmol/L Final    Urea Nitrogen 12/02/2021 19  6 - 23 mg/dL Final    Creatinine 12/02/2021 0.91  0.50 - 1.05 mg/dL Final    GLOMERULAR FILTRATION RATE-NON AFR* 12/02/2021 >60  >60 mL/min/1.73m2 Final    GLOMERULAR FILTRATION RATE-* 12/02/2021 >60  >60 mL/min/1.73m2  Final    Comment:  CALCULATIONS OF ESTIMATED GFR ARE PERFORMED   USING THE MDRD STUDY EQUATION FOR THE   IDMS-TRACEABLE CREATININE METHODS.   CLIN CHEM 2007;53:766-72      Calcium 12/02/2021 9.8  8.6 - 10.6 mg/dL Final    Albumin 12/02/2021 4.0  3.4 - 5.0 g/dL Final    Alkaline Phosphatase 12/02/2021 129  33 - 136 U/L Final    Total Protein 12/02/2021 6.8  6.4 - 8.2 g/dL Final    AST 12/02/2021 10  9 - 39 U/L Final    Total Bilirubin 12/02/2021 0.3  0.0 - 1.2 mg/dL Final    ALT (SGPT) 12/02/2021 11  7 - 45 U/L Final    Comment:  Patients treated with Sulfasalazine may generate    falsely decreased results for ALT.      Cholesterol 12/02/2021 124  0 - 199 mg/dL Final    Comment: .      AGE      DESIRABLE   BORDERLINE HIGH   HIGH     0-19 Y     0 - 169       170 - 199     >/= 200    20-24 Y     0 - 189       190 - 224     >/= 225         >24 Y     0 - 199       200 - 239     >/= 240   **All ranges are based on fasting samples. Specific   therapeutic targets will vary based on patient-specific   cardiac risk.  .   Pediatric guidelines reference:Pediatrics 2011, 128(S5).   Adult guidelines reference: NCEP ATPIII Guidelines,     KAREEM 2001, 258:2486-97  .   Venipuncture immediately after or during the    administration of Metamizole may lead to falsely   low results. Testing should be performed immediately   prior to Metamizole dosing.      HDL 12/02/2021 35.4 (A)  mg/dL Final    Comment: .      AGE      VERY LOW   LOW     NORMAL    HIGH       0-19 Y       < 35   < 40     40-45     ----    20-24 Y       ----   < 40       >45     ----      >24 Y       ----   < 40     40-60      >60  .      Cholesterol/HDL Ratio 12/02/2021 3.5   Final    Comment: REF VALUES  DESIRABLE  < 3.4  HIGH RISK  > 5.0      LDL 12/02/2021 48  0 - 99 mg/dL Final    Comment: .                           NEAR      BORD      AGE      DESIRABLE  OPTIMAL    HIGH     HIGH     VERY HIGH     0-19 Y     0 - 109     ---    110-129   >/= 130     ----    20-24 Y      0 - 119     ---    120-159   >/= 160     ----      >24 Y     0 -  99   100-129  130-159   160-189     >/=190  .      VLDL 12/02/2021 41 (H)  0 - 40 mg/dL Final    Triglycerides 12/02/2021 205 (H)  0 - 149 mg/dL Final    Comment: .      AGE      DESIRABLE   BORDERLINE HIGH   HIGH     VERY HIGH   0 D-90 D    19 - 174         ----         ----        ----  91 D- 9 Y     0 -  74        75 -  99     >/= 100      ----    10-19 Y     0 -  89        90 - 129     >/= 130      ----    20-24 Y     0 - 114       115 - 149     >/= 150      ----         >24 Y     0 - 149       150 - 199    200- 499    >/= 500  .   Venipuncture immediately after or during the    administration of Metamizole may lead to falsely   low results. Testing should be performed immediately   prior to Metamizole dosing.      Non HDL Cholesterol 12/02/2021 89  mg/dL Final    Comment:     AGE      DESIRABLE   BORDERLINE HIGH   HIGH     VERY HIGH     0-19 Y     0 - 119       120 - 144     >/= 145    >/= 160    20-24 Y     0 - 149       150 - 189     >/= 190      ----         >24 Y    30 MG/DL ABOVE LDL CHOLESTEROL GOAL  .      Hemoglobin A1C 12/02/2021 6.7 (A)  % Final    Comment:      Diagnosis of Diabetes-Adults   Non-Diabetic: < or = 5.6%   Increased risk for developing diabetes: 5.7-6.4%   Diagnostic of diabetes: > or = 6.5%  .       Monitoring of Diabetes                Age (y)     Therapeutic Goal (%)   Adults:          >18           <7.0   Pediatrics:    13-18           <7.5                   7-12           <8.0                   0- 6            7.5-8.5   American Diabetes Association. Diabetes Care 33(S1), Jan 2010.      Estimated Average Glucose 12/02/2021 146  MG/DL Final     Current Outpatient Medications on File Prior to Visit   Medication Sig Dispense Refill    aspirin 81 mg EC tablet Take 1 tablet (81 mg) by mouth once daily. 30 tablet 11    atorvastatin (Lipitor) 40 mg tablet Take 1 tablet (40 mg) by mouth once daily at bedtime. 90 tablet 1     "buPROPion SR (Wellbutrin SR) 150 mg 12 hr tablet Take 1 tablet (150 mg) by mouth once daily. Do not crush, chew, or split. Take with 300 mg tablet for total dose 450 mg 30 tablet 5    buPROPion XL (Wellbutrin XL) 300 mg 24 hr tablet Take 1 tablet (300 mg) by mouth once daily. Do not crush, chew, or split. 90 tablet 1    clopidogrel (Plavix) 75 mg tablet Take 1 tablet (75 mg) by mouth once daily. 90 tablet 0    DULoxetine (Cymbalta) 60 mg DR capsule Take 1 capsule (60 mg) by mouth once daily. Do not crush or chew. 90 capsule 1    FreeStyle Tyshawn 2 Sensor kit USE as directed TO monitor blood glucose. replace every 14 DAYS.      glucose 4 gram chewable tablet Chew 4 tablets (16 g) if needed.      insulin glargine (Lantus Solostar U-100 Insulin) 100 unit/mL (3 mL) pen Inject 12 Units under the skin once daily at bedtime. 30 mL 1    LORazepam (Ativan) 0.5 mg tablet Take 1 to 2 tablets (0.5mg to 1mg) 30 minutes prior to flight 4 tablet 0    meclizine (Antivert) 25 mg tablet Take 0.5 tablets (12.5 mg) by mouth 3 times a day as needed for dizziness. 30 tablet 11    metFORMIN (Glucophage) 500 mg tablet Take 2 tablets (1,000 mg) by mouth 2 times a day. 360 tablet 3    metoclopramide (Reglan) 5 mg tablet Take 1 tablet (5 mg) by mouth 4 times a day. 360 tablet 1    oxybutynin XL (Ditropan-XL) 10 mg 24 hr tablet Take 1 tablet (10 mg) by mouth once daily at bedtime. Do not crush, chew, or split. 90 tablet 1    pen needle, diabetic (BD Conchita 2nd Gen Pen Needle) 32 gauge x 5/32\" needle Use one new needle once daily with Lantus 100 each 1    [DISCONTINUED] FreeStyle Tyshawn 2 [place] misc USE as directed TO monitor blood glucose with Tyshawn Sensor      [DISCONTINUED] lancets (OneTouch Delica Plus Lancet) 33 gauge misc Use as directed to test blood sugars up to 5 times a day 100 each 0    [DISCONTINUED] lisinopril 40 mg tablet Take 1 tablet (40 mg) by mouth once daily. 90 tablet 1    [DISCONTINUED] molnupiravir 200 mg capsule Take 4 " capsules (800 mg) by mouth 2 times a day. 40 capsule 0     No current facility-administered medications on file prior to visit.     No images are attached to the encounter.          Assessment/Plan   Diagnoses and all orders for this visit:  COVID-19  -     molnupiravir (Lagevrio) capsule capsule; Take 4 capsules (800 mg) by mouth every 12 hours for 5 days.    Patient to start on molnupirvanir  Patient to call if questions or concerns

## 2024-08-30 NOTE — PROGRESS NOTES
Transitional Care Management: Pharmacy    Subjective   Glenna Delgado is a 69 y.o. female who was referred to Clinical Pharmacy Team to complete TCM medication reconciliation prior to office visit with Marleen Llanes DO on 8/30/24. A comprehensive medication review was completed with the caregiver. caregiver had all medications and/or an updated medication list in front of them during the telephone encounter.     Medication Reconciliation  General Medication Management    Type of medication management: comprehensive medication review  Referred by: case management  Targeting Criteria Met: drug management program at-risk beneficiary  Is the Beneficiary in a Long Term Care Facility at the Time of the First CMR Offer?: no  Cognitive ability: good  Cognitive impairment status verified this year: no  Recipient: caregiver  Provider: hospital pharmacist  Visit type: initial  Method of contact: by telephone  Medications at visit: does not bring in medications  Caffeine use: daily  Physical activity level: inactive  Diet: fair       Medication Adherence    What concerns does the patient have in regards to their medications: No active medication concerns  Patient reported X missed doses in the last 7 days: 0  Any gaps in refill history greater than 2 weeks in the last 3 months: no  Demonstrates understanding of importance of adherence: yes  Informant: caregiver  Reliability of informant: fairly reliable  Estimated medication adherence level: %  Adherence estimation source: claims history  Reasons for non-adherence: no problems identified  Adherence tools used: directed education  Support network for adherence: family member, healthcare provider       No issues reported in regards to accessibility affordability adherence adverse effects organization.    Allergies/Intolerances: No Known Allergies    Current Outpatient Medications   Medication Instructions    aspirin 81 mg, oral, Daily    atorvastatin (LIPITOR) 40 mg,  "oral, Nightly    buPROPion SR (WELLBUTRIN SR) 150 mg, oral, Daily, Do not crush, chew, or split. Take with 300 mg tablet for total dose 450 mg    buPROPion XL (WELLBUTRIN XL) 300 mg, oral, Daily, Do not crush, chew, or split.    clopidogrel (PLAVIX) 75 mg, oral, Daily    DULoxetine (CYMBALTA) 60 mg, oral, Daily, Do not crush or chew.    FreeStyle Tyshawn 2 Sensor kit USE as directed TO monitor blood glucose. replace every 14 DAYS.    glucose 16 g, oral, As needed    Lantus Solostar U-100 Insulin 12 Units, subcutaneous, Nightly    LORazepam (Ativan) 0.5 mg tablet Take 1 to 2 tablets (0.5mg to 1mg) 30 minutes prior to flight    meclizine (ANTIVERT) 12.5 mg, oral, 3 times daily PRN    metFORMIN (GLUCOPHAGE) 1,000 mg, oral, 2 times daily    metoclopramide (REGLAN) 5 mg, oral, 4 times daily    oxybutynin XL (DITROPAN-XL) 10 mg, oral, Nightly, Do not crush, chew, or split.    pen needle, diabetic (BD Conchita 2nd Gen Pen Needle) 32 gauge x 5/32\" needle Use one new needle once daily with Lantus     Removed:  Lisinopril  Molnupiravir    Objective     Lab  Wt Readings from Last 3 Encounters:   12/12/23 97.3 kg (214 lb 6.4 oz)   08/25/23 91.3 kg (201 lb 6 oz)   12/07/22 98.2 kg (216 lb 6.4 oz)     Pulse Readings from Last 3 Encounters:   12/12/23 87   08/25/23 68   08/11/23 76     BP Readings from Last 3 Encounters:   12/12/23 128/70   08/25/23 122/77   08/11/23 109/72      Lab Results   Component Value Date    BILITOT 0.3 12/02/2021    CALCIUM 9.8 12/02/2021    CO2 27 12/02/2021     12/02/2021    CREATININE 0.91 12/02/2021    CREATININE 0.81 11/03/2020    CREATININE 0.71 05/11/2020    GLUCOSE 183 (H) 12/02/2021    ALKPHOS 129 12/02/2021    K 4.4 12/02/2021    PROT 6.8 12/02/2021     12/02/2021    AST 10 12/02/2021    ALT 11 12/02/2021    BUN 19 12/02/2021    ANIONGAP 14 12/02/2021    ALBUMIN 4.0 12/02/2021     Lab Results   Component Value Date    TRIG 205 (H) 12/02/2021    TRIG 190 (H) 05/11/2020    CHOL 124 " 12/02/2021    CHOL 152 05/11/2020    HDL 35.4 (A) 12/02/2021    HDL 38.3 (A) 05/11/2020     Lab Results   Component Value Date    HGBA1C 5.5 07/16/2023    HGBA1C 6.7 (A) 12/02/2021    HGBA1C 6.4 05/11/2020     Drug Interactions:  No pertinent DDI    Assessment/Plan    Indication, effectiveness, safety and convenience of her medications were reviewed today. The patient's medical conditions were assessed, evaluated, and deemed meeting goals of drug therapy, with the following exceptions.      Medication Therapy Recommendations Needing Review  Depression    Current Medication: buPROPion SR (Wellbutrin SR) 150 mg 12 hr tablet   Rationale: Dosage form inappropriate   Recommendation: Change Medication Formulation  - buPROPion  mg 24 hr tablet - Take 1 tablet by mouth once daily alongside 300mg dose for a total daily dose of 450 mg   Note: Patient is to be on a total daily dose of Wellbutrin 450mg daily. Was given 2 different Wellbutrin formulations, which is inappropriate. Should be on Wellbutrin XL 300mg once daily and Wellbutrin XL 150mg once daily for a total daily dose of 450mg, NOT SR Wellbutrin 150mg dosed once daily alongside the XL Wellbutrin.           Comments/Recommendations to PCP:  Reactivate a referral to the clinical pharmacy team with  to help manage diabetes - patients sugars are usually over 200 mg/dL and could use assistance with meds and lifestyle  Change Wellbutrin SR 150mg daily to Wellbutrin XL 150mg daily.    Elizabeth Alfredo PharmD    Verbal consent to manage patient's drug therapy was obtained from the patient and/or an individual authorized to act on behalf of a patient. They were informed they may decline to participate or withdraw from participation in pharmacy services at any time.

## 2024-08-31 ENCOUNTER — TELEPHONE (OUTPATIENT)
Dept: PRIMARY CARE | Facility: CLINIC | Age: 69
End: 2024-08-31
Payer: MEDICARE

## 2024-08-31 NOTE — TELEPHONE ENCOUNTER
Pts spouse called stating Dr. Llanes wanted her to call and inform of the price of the medication and how the med is working the price was expensive $296.52   Takes 4 pills twice a day for 5 days first dose was last night.   wife couldn't take the med that he took because of her other medications.     Also pt had to cancel her flight/vacation plans because of having Covid and the airline is requesting a note from you stating she had Covid or the pt will get hit with cancellation fee. Please Advise. Thank you!    Pt phone: [phone]

## 2024-09-03 DIAGNOSIS — F41.9 ANXIETY: ICD-10-CM

## 2024-09-03 DIAGNOSIS — F32.9 REACTIVE DEPRESSION: ICD-10-CM

## 2024-09-03 DIAGNOSIS — Z79.4 TYPE 2 DIABETES MELLITUS WITH OTHER CIRCULATORY COMPLICATION, WITH LONG-TERM CURRENT USE OF INSULIN (MULTI): ICD-10-CM

## 2024-09-03 DIAGNOSIS — E11.59 TYPE 2 DIABETES MELLITUS WITH OTHER CIRCULATORY COMPLICATION, WITH LONG-TERM CURRENT USE OF INSULIN (MULTI): ICD-10-CM

## 2024-09-03 RX ORDER — BUPROPION HYDROCHLORIDE 150 MG/1
150 TABLET ORAL EVERY MORNING
Qty: 30 TABLET | Refills: 5 | Status: SHIPPED | OUTPATIENT
Start: 2024-09-03 | End: 2025-03-02

## 2024-09-03 NOTE — TELEPHONE ENCOUNTER
In progress   Ms Ricci,  said she will be here tomorrow for her procedure at 9 am..    She would also like to speak to a RN she has questions

## 2024-09-25 ENCOUNTER — PATIENT OUTREACH (OUTPATIENT)
Dept: PRIMARY CARE | Facility: CLINIC | Age: 69
End: 2024-09-25
Payer: MEDICARE

## 2024-09-25 DIAGNOSIS — E11.59 TYPE 2 DIABETES MELLITUS WITH OTHER CIRCULATORY COMPLICATION, WITH LONG-TERM CURRENT USE OF INSULIN: ICD-10-CM

## 2024-09-25 DIAGNOSIS — I10 PRIMARY HYPERTENSION: ICD-10-CM

## 2024-09-25 DIAGNOSIS — Z79.4 TYPE 2 DIABETES MELLITUS WITH OTHER CIRCULATORY COMPLICATION, WITH LONG-TERM CURRENT USE OF INSULIN: ICD-10-CM

## 2024-09-25 DIAGNOSIS — F32.9 REACTIVE DEPRESSION: ICD-10-CM

## 2024-09-25 DIAGNOSIS — R42 VERTIGO: ICD-10-CM

## 2024-09-25 RX ORDER — METOCLOPRAMIDE 5 MG/1
5 TABLET ORAL 4 TIMES DAILY
Qty: 360 TABLET | Refills: 1 | Status: SHIPPED | OUTPATIENT
Start: 2024-09-25

## 2024-09-25 RX ORDER — MECLIZINE HYDROCHLORIDE 25 MG/1
12.5 TABLET ORAL 3 TIMES DAILY PRN
Qty: 90 TABLET | Refills: 1 | Status: SHIPPED | OUTPATIENT
Start: 2024-09-25

## 2024-09-25 RX ORDER — DULOXETIN HYDROCHLORIDE 60 MG/1
60 CAPSULE, DELAYED RELEASE ORAL DAILY
Qty: 90 CAPSULE | Refills: 1 | Status: SHIPPED | OUTPATIENT
Start: 2024-09-25

## 2024-09-25 NOTE — PROGRESS NOTES
Call received from patient for some medication renewals. Patient is asking for Duloxetine, Lisinopril, Reglan, Meclizine and her Free style Sensors. I reviewed her chart and the Lisinopril is marked by [facility] as being discontinued. I have a message out to that individual to see if that was purposefully discontinued or in error. Also I will need to send a request to Jeovany to see how to go about ordering the Tyshawn sensors. Patient's  indicated those come from [facility]. Other Rx's pended to PCP for renewal.     Patient states her fasting AM glucose readings are in the 200's but during the day they come down like 160 or so. She is encouraged to keep working with Pharmacy team regarding her glucose levels. Also I encouraged her to get her labs done that were ordered back in March. Patient has not had BW done in a while and she needs to have them done. She uses a lab close to her in [place], so I will mail her the orders.

## 2024-10-01 ENCOUNTER — PATIENT OUTREACH (OUTPATIENT)
Dept: PRIMARY CARE | Facility: CLINIC | Age: 69
End: 2024-10-01
Payer: MEDICARE

## 2024-10-01 DIAGNOSIS — E11.9 TYPE 2 DIABETES MELLITUS WITHOUT COMPLICATION, WITH LONG-TERM CURRENT USE OF INSULIN (MULTI): ICD-10-CM

## 2024-10-01 DIAGNOSIS — I63.9 CEREBROVASCULAR ACCIDENT (CVA), UNSPECIFIED MECHANISM (MULTI): ICD-10-CM

## 2024-10-01 DIAGNOSIS — I10 PRIMARY HYPERTENSION: ICD-10-CM

## 2024-10-01 DIAGNOSIS — Z79.4 TYPE 2 DIABETES MELLITUS WITHOUT COMPLICATION, WITH LONG-TERM CURRENT USE OF INSULIN (MULTI): ICD-10-CM

## 2024-10-01 RX ORDER — LISINOPRIL 40 MG/1
40 TABLET ORAL DAILY
Qty: 90 TABLET | Refills: 3 | Status: SHIPPED | OUTPATIENT
Start: 2024-10-01

## 2024-10-01 NOTE — PROGRESS NOTES
I spoke with Dr. Llanes and the pharmacy team this morning. We discussed patient's Lisinopril. I received a message back from Amy CROCKETT, that patient had reported to her that she was no longer taking the Lisinopril and to please take it off her list. Patient then asked me for a refill and said she was on it. Dr. Llanes states the patient should continue with this medication, but we need BP readings. Mr. Delgado has stated that they have not been checking her BP. I informed him the plan is to renew the medication (Rx pended to PCP) but I will need them to please check BP's daily for one week and then we will touch base to review those readings.     Also I talked to Jeovany about ordering the Free Style Tyshawn sensors from [facility]. He said he would go to [employer] and submit this. Mr. Delgado then called me about a text he received. I let him know that he is signed up for alerts from [employer] regarding the DME order.

## 2024-10-08 ENCOUNTER — PATIENT OUTREACH (OUTPATIENT)
Dept: PRIMARY CARE | Facility: CLINIC | Age: 69
End: 2024-10-08
Payer: MEDICARE

## 2024-10-08 DIAGNOSIS — Z79.4 TYPE 2 DIABETES MELLITUS WITHOUT COMPLICATION, WITH LONG-TERM CURRENT USE OF INSULIN (MULTI): ICD-10-CM

## 2024-10-08 DIAGNOSIS — I10 PRIMARY HYPERTENSION: ICD-10-CM

## 2024-10-08 DIAGNOSIS — E11.9 TYPE 2 DIABETES MELLITUS WITHOUT COMPLICATION, WITH LONG-TERM CURRENT USE OF INSULIN (MULTI): ICD-10-CM

## 2024-10-08 NOTE — PROGRESS NOTES
Patient's  calls in to report her BP's this past week. He states they have been taking them in the morning after getting up, eating and taking her meds. She usually does not get up until about noon.   1st 141/88   2nd missed  3rd 142/71   4th 135/76  5th 153/79  6th 138/70  7th 142/85  8th 121/83    I did explain that based on these numbers patient would need to stay on Lisinopril. I also encouraged them to continue to monitor readings about once a week. We will follow up on this at her appt in December. Her BP is not at goal yet. I know she has had issues with hypotension though in the past as well. Patient denies any new dizziness or headaches.

## 2024-10-21 DIAGNOSIS — E11.9 TYPE 2 DIABETES MELLITUS WITHOUT COMPLICATION, WITH LONG-TERM CURRENT USE OF INSULIN (MULTI): ICD-10-CM

## 2024-10-21 DIAGNOSIS — I63.9 CEREBROVASCULAR ACCIDENT (CVA), UNSPECIFIED MECHANISM (MULTI): ICD-10-CM

## 2024-10-21 DIAGNOSIS — Z79.4 TYPE 2 DIABETES MELLITUS WITHOUT COMPLICATION, WITH LONG-TERM CURRENT USE OF INSULIN (MULTI): ICD-10-CM

## 2024-10-21 RX ORDER — CLOPIDOGREL BISULFATE 75 MG/1
75 TABLET ORAL DAILY
Qty: 90 TABLET | Refills: 1 | Status: SHIPPED | OUTPATIENT
Start: 2024-10-21

## 2024-10-21 RX ORDER — ATORVASTATIN CALCIUM 40 MG/1
40 TABLET, FILM COATED ORAL NIGHTLY
Qty: 90 TABLET | Refills: 1 | Status: SHIPPED | OUTPATIENT
Start: 2024-10-21

## 2024-10-22 DIAGNOSIS — E11.59 TYPE 2 DIABETES MELLITUS WITH OTHER CIRCULATORY COMPLICATION, WITH LONG-TERM CURRENT USE OF INSULIN: Primary | ICD-10-CM

## 2024-10-22 DIAGNOSIS — Z79.4 TYPE 2 DIABETES MELLITUS WITH OTHER CIRCULATORY COMPLICATION, WITH LONG-TERM CURRENT USE OF INSULIN: Primary | ICD-10-CM

## 2024-11-12 DIAGNOSIS — E11.59 TYPE 2 DIABETES MELLITUS WITH OTHER CIRCULATORY COMPLICATION, WITH LONG-TERM CURRENT USE OF INSULIN: ICD-10-CM

## 2024-11-12 DIAGNOSIS — Z79.4 TYPE 2 DIABETES MELLITUS WITH OTHER CIRCULATORY COMPLICATION, WITH LONG-TERM CURRENT USE OF INSULIN: ICD-10-CM

## 2024-11-12 RX ORDER — PEN NEEDLE, DIABETIC 30 GX3/16"
NEEDLE, DISPOSABLE MISCELLANEOUS
Qty: 100 EACH | Refills: 3 | Status: SHIPPED | OUTPATIENT
Start: 2024-11-12

## 2024-11-13 ENCOUNTER — PATIENT OUTREACH (OUTPATIENT)
Dept: PRIMARY CARE | Facility: CLINIC | Age: 69
End: 2024-11-13
Payer: MEDICARE

## 2024-11-13 DIAGNOSIS — E11.9 TYPE 2 DIABETES MELLITUS WITHOUT COMPLICATION, WITH LONG-TERM CURRENT USE OF INSULIN (MULTI): ICD-10-CM

## 2024-11-13 DIAGNOSIS — Z79.4 TYPE 2 DIABETES MELLITUS WITHOUT COMPLICATION, WITH LONG-TERM CURRENT USE OF INSULIN (MULTI): ICD-10-CM

## 2024-11-13 NOTE — PROGRESS NOTES
Call received yesterday from patient/ about needing a refill on her pen needles. I took that information and pended the script to PCP and she did approve it. Mr. Delgado also asked about a call he has been receiving about making an appt with some pharmacist. I asked him to give me some time to review her chart and I would find out more. Through chart review I found that we did refer to pharmacy for her diabetes. It was because of her fluctuating blood sugar readings that I wondered if her Lantus needed adjusted. So he has been getting calls from the schedulers to set up an appt with an [facility] pharmacist. After our discussion, he stated he will call them back now to set something up since he actually just got a 3rd message this morning from them.

## 2024-11-15 ENCOUNTER — LAB (OUTPATIENT)
Dept: LAB | Facility: LAB | Age: 69
End: 2024-11-15
Payer: MEDICARE

## 2024-11-15 DIAGNOSIS — I10 PRIMARY HYPERTENSION: ICD-10-CM

## 2024-11-15 DIAGNOSIS — Z79.4 TYPE 2 DIABETES MELLITUS WITH OTHER CIRCULATORY COMPLICATION, WITH LONG-TERM CURRENT USE OF INSULIN: ICD-10-CM

## 2024-11-15 DIAGNOSIS — E78.00 PURE HYPERCHOLESTEROLEMIA: ICD-10-CM

## 2024-11-15 DIAGNOSIS — E55.9 VITAMIN D DEFICIENCY: ICD-10-CM

## 2024-11-15 DIAGNOSIS — E11.59 TYPE 2 DIABETES MELLITUS WITH OTHER CIRCULATORY COMPLICATION, WITH LONG-TERM CURRENT USE OF INSULIN: ICD-10-CM

## 2024-11-15 PROCEDURE — 85025 COMPLETE CBC W/AUTO DIFF WBC: CPT

## 2024-11-15 PROCEDURE — 83036 HEMOGLOBIN GLYCOSYLATED A1C: CPT

## 2024-11-15 PROCEDURE — 80061 LIPID PANEL: CPT

## 2024-11-15 PROCEDURE — 82306 VITAMIN D 25 HYDROXY: CPT

## 2024-11-15 PROCEDURE — 80053 COMPREHEN METABOLIC PANEL: CPT

## 2024-11-15 PROCEDURE — 36415 COLL VENOUS BLD VENIPUNCTURE: CPT

## 2024-11-15 PROCEDURE — 84443 ASSAY THYROID STIM HORMONE: CPT

## 2024-11-15 NOTE — PROGRESS NOTES
Subjective   Patient ID: Glenna Delgado is a 69 y.o. female who presents for No chief complaint on file..    Referring Provider: Marleen Llanes,      Diabetes  She presents for her follow-up diabetic visit. She has type 2 diabetes mellitus. Her disease course has been stable.   Spoke to patient's  who stated that patient has been doing well with the Freestyle Tyshawn CGM. He reported an average 7 days BG of 140 mg/dL. No hypoglycemia symptoms reported.     From the last visit with the pharmacist, patient was educated on the types of food she should try to eat before bed including protein and complex carbs. Patient has not been consistent with this diet but they are working towards it.     Review of Systems    Objective     There were no vitals taken for this visit.     Labs  Lab Results   Component Value Date    BILITOT 0.4 11/15/2024    CALCIUM 9.4 11/15/2024    CO2 24 11/15/2024    CL 97 (L) 11/15/2024    CREATININE 0.78 11/15/2024    GLUCOSE 219 (H) 11/15/2024    ALKPHOS 85 11/15/2024    K 4.1 11/15/2024    PROT 6.7 11/15/2024     11/15/2024    AST 14 11/15/2024    ALT 15 11/15/2024    BUN 11 11/15/2024    ANIONGAP 20 11/15/2024    ALBUMIN 3.9 11/15/2024     Lab Results   Component Value Date    TRIG 193 (H) 11/15/2024    CHOL 133 11/15/2024    LDLCALC 50 11/15/2024    HDL 44.0 11/15/2024     Lab Results   Component Value Date    HGBA1C 6.6 (H) 11/15/2024       Current Outpatient Medications on File Prior to Visit   Medication Sig Dispense Refill    aspirin 81 mg EC tablet Take 1 tablet (81 mg) by mouth once daily. 30 tablet 11    atorvastatin (Lipitor) 40 mg tablet Take 1 tablet (40 mg) by mouth once daily at bedtime. 90 tablet 1    buPROPion SR (Wellbutrin SR) 150 mg 12 hr tablet Take 1 tablet (150 mg) by mouth once daily. Do not crush, chew, or split. Take with 300 mg tablet for total dose 450 mg 30 tablet 5    buPROPion XL (Wellbutrin XL) 150 mg 24 hr tablet Take 1 tablet (150 mg) by mouth once  "daily in the morning. Do not crush, chew, or split. 30 tablet 5    buPROPion XL (Wellbutrin XL) 300 mg 24 hr tablet Take 1 tablet (300 mg) by mouth once daily. Do not crush, chew, or split. 90 tablet 1    clopidogrel (Plavix) 75 mg tablet Take 1 tablet (75 mg) by mouth once daily. 90 tablet 1    DULoxetine (Cymbalta) 60 mg DR capsule Take 1 capsule (60 mg) by mouth once daily. Do not crush or chew. 90 capsule 1    FreeStyle Tyshawn 2 Sensor kit USE as directed TO monitor blood glucose. replace every 14 DAYS.      glucose 4 gram chewable tablet Chew 4 tablets (16 g) if needed.      insulin glargine (Lantus Solostar U-100 Insulin) 100 unit/mL (3 mL) pen Inject 12 Units under the skin once daily at bedtime. 30 mL 1    lisinopril 40 mg tablet Take 1 tablet (40 mg) by mouth once daily. 90 tablet 3    LORazepam (Ativan) 0.5 mg tablet Take 1 to 2 tablets (0.5mg to 1mg) 30 minutes prior to flight 4 tablet 0    meclizine (Antivert) 25 mg tablet Take 0.5 tablets (12.5 mg) by mouth 3 times a day as needed for dizziness. 90 tablet 1    metoclopramide (Reglan) 5 mg tablet Take 1 tablet (5 mg) by mouth 4 times a day. 360 tablet 1    oxybutynin XL (Ditropan-XL) 10 mg 24 hr tablet Take 1 tablet (10 mg) by mouth once daily at bedtime. Do not crush, chew, or split. 90 tablet 1    pen needle, diabetic (BD Conchita 2nd Gen Pen Needle) 32 gauge x 5/32\" needle Use one new needle once daily with Lantus 100 each 3    [DISCONTINUED] metFORMIN (Glucophage) 500 mg tablet Take 2 tablets (1,000 mg) by mouth 2 times a day. 360 tablet 3    [DISCONTINUED] pen needle, diabetic (BD Conchita 2nd Gen Pen Needle) 32 gauge x 5/32\" needle Use one new needle once daily with Lantus 100 each 1     No current facility-administered medications on file prior to visit.        Assessment/Plan   Problem List Items Addressed This Visit             ICD-10-CM    Diabetes mellitus (Multi) E11.9    Relevant Medications    metFORMIN (Glucophage) 500 mg tablet    Other Relevant " Orders    Referral to Clinical Pharmacy   A1c well-controlled at 6.6% Reporting post meal hyperglycemia > 180. Cautiously increase to 14 units Lantus daily. Refill metformin.  Assess BG in 2 weeks  Follow up 12/2/24 @ 1340  PCP 12/16/24      Luis Alberto Tsai PharmD    Continue all meds under the continuation of care with the referring provider and clinical pharmacy team.

## 2024-11-16 LAB
25(OH)D3 SERPL-MCNC: 16 NG/ML (ref 30–100)
ALBUMIN SERPL BCP-MCNC: 3.9 G/DL (ref 3.4–5)
ALP SERPL-CCNC: 85 U/L (ref 33–136)
ALT SERPL W P-5'-P-CCNC: 15 U/L (ref 7–45)
ANION GAP SERPL CALC-SCNC: 20 MMOL/L (ref 10–20)
AST SERPL W P-5'-P-CCNC: 14 U/L (ref 9–39)
BASOPHILS # BLD AUTO: 0.06 X10*3/UL (ref 0–0.1)
BASOPHILS NFR BLD AUTO: 0.7 %
BILIRUB SERPL-MCNC: 0.4 MG/DL (ref 0–1.2)
BUN SERPL-MCNC: 11 MG/DL (ref 6–23)
CALCIUM SERPL-MCNC: 9.4 MG/DL (ref 8.6–10.6)
CHLORIDE SERPL-SCNC: 97 MMOL/L (ref 98–107)
CHOLEST SERPL-MCNC: 133 MG/DL (ref 0–199)
CHOLESTEROL/HDL RATIO: 3
CO2 SERPL-SCNC: 24 MMOL/L (ref 21–32)
CREAT SERPL-MCNC: 0.78 MG/DL (ref 0.5–1.05)
EGFRCR SERPLBLD CKD-EPI 2021: 82 ML/MIN/1.73M*2
EOSINOPHIL # BLD AUTO: 0.12 X10*3/UL (ref 0–0.7)
EOSINOPHIL NFR BLD AUTO: 1.4 %
ERYTHROCYTE [DISTWIDTH] IN BLOOD BY AUTOMATED COUNT: 14.5 % (ref 11.5–14.5)
EST. AVERAGE GLUCOSE BLD GHB EST-MCNC: 143 MG/DL
GLUCOSE SERPL-MCNC: 219 MG/DL (ref 74–99)
HBA1C MFR BLD: 6.6 %
HCT VFR BLD AUTO: 40.3 % (ref 36–46)
HDLC SERPL-MCNC: 44 MG/DL
HGB BLD-MCNC: 12.3 G/DL (ref 12–16)
IMM GRANULOCYTES # BLD AUTO: 0.09 X10*3/UL (ref 0–0.7)
IMM GRANULOCYTES NFR BLD AUTO: 1 % (ref 0–0.9)
LDLC SERPL CALC-MCNC: 50 MG/DL
LYMPHOCYTES # BLD AUTO: 1.91 X10*3/UL (ref 1.2–4.8)
LYMPHOCYTES NFR BLD AUTO: 21.8 %
MCH RBC QN AUTO: 27.6 PG (ref 26–34)
MCHC RBC AUTO-ENTMCNC: 30.5 G/DL (ref 32–36)
MCV RBC AUTO: 90 FL (ref 80–100)
MONOCYTES # BLD AUTO: 0.51 X10*3/UL (ref 0.1–1)
MONOCYTES NFR BLD AUTO: 5.8 %
NEUTROPHILS # BLD AUTO: 6.07 X10*3/UL (ref 1.2–7.7)
NEUTROPHILS NFR BLD AUTO: 69.3 %
NON HDL CHOLESTEROL: 89 MG/DL (ref 0–149)
NRBC BLD-RTO: 0 /100 WBCS (ref 0–0)
PLATELET # BLD AUTO: 273 X10*3/UL (ref 150–450)
POTASSIUM SERPL-SCNC: 4.1 MMOL/L (ref 3.5–5.3)
PROT SERPL-MCNC: 6.7 G/DL (ref 6.4–8.2)
RBC # BLD AUTO: 4.46 X10*6/UL (ref 4–5.2)
SODIUM SERPL-SCNC: 137 MMOL/L (ref 136–145)
TRIGL SERPL-MCNC: 193 MG/DL (ref 0–149)
TSH SERPL-ACNC: 1.83 MIU/L (ref 0.44–3.98)
VLDL: 39 MG/DL (ref 0–40)
WBC # BLD AUTO: 8.8 X10*3/UL (ref 4.4–11.3)

## 2024-11-18 ENCOUNTER — APPOINTMENT (OUTPATIENT)
Dept: PHARMACY | Facility: HOSPITAL | Age: 69
End: 2024-11-18
Payer: MEDICARE

## 2024-11-18 DIAGNOSIS — Z79.4 TYPE 2 DIABETES MELLITUS WITH OTHER CIRCULATORY COMPLICATION, WITH LONG-TERM CURRENT USE OF INSULIN: ICD-10-CM

## 2024-11-18 DIAGNOSIS — E11.59 TYPE 2 DIABETES MELLITUS WITH OTHER CIRCULATORY COMPLICATION, WITH LONG-TERM CURRENT USE OF INSULIN: ICD-10-CM

## 2024-11-18 RX ORDER — METFORMIN HYDROCHLORIDE 500 MG/1
1000 TABLET ORAL 2 TIMES DAILY
Qty: 360 TABLET | Refills: 3 | Status: SHIPPED | OUTPATIENT
Start: 2024-11-18 | End: 2025-11-18

## 2024-12-02 ENCOUNTER — APPOINTMENT (OUTPATIENT)
Dept: PHARMACY | Facility: HOSPITAL | Age: 69
End: 2024-12-02
Payer: MEDICARE

## 2024-12-05 ENCOUNTER — TELEPHONE (OUTPATIENT)
Dept: PRIMARY CARE | Facility: CLINIC | Age: 69
End: 2024-12-05
Payer: MEDICARE

## 2024-12-05 NOTE — TELEPHONE ENCOUNTER
Celina from [facility] called and stated patient came in for her routine mammogram and had mentioned she has hardness of left breast, requesting order for bilateral diagnostic mammogram with ultrasound of L breast. Fax orders to [phone] cm Torres.

## 2024-12-06 DIAGNOSIS — N64.51 HARDNESS OF BREAST: ICD-10-CM

## 2024-12-09 ENCOUNTER — APPOINTMENT (OUTPATIENT)
Dept: PHARMACY | Facility: HOSPITAL | Age: 69
End: 2024-12-09
Payer: MEDICARE

## 2024-12-09 DIAGNOSIS — E11.59 TYPE 2 DIABETES MELLITUS WITH OTHER CIRCULATORY COMPLICATION, WITH LONG-TERM CURRENT USE OF INSULIN: ICD-10-CM

## 2024-12-09 DIAGNOSIS — Z79.4 TYPE 2 DIABETES MELLITUS WITH OTHER CIRCULATORY COMPLICATION, WITH LONG-TERM CURRENT USE OF INSULIN: ICD-10-CM

## 2024-12-09 NOTE — PROGRESS NOTES
Subjective   Patient ID: Glenna Delgado is a 69 y.o. female who presents for Diabetes.    Referring Provider: Marleen Llanes,      Diabetes  She presents for her follow-up diabetic visit. She has type 2 diabetes mellitus. Her disease course has been stable.   Spoke to patient's  who stated that patient has been doing well with the Freestyle Tyshawn CGM. He reported an average 7 days BG of 151 mg/dL, 14-day average 144. No hypoglycemia symptoms reported.     From the last visit with the pharmacist, patient was educated on the types of food she should try to eat before bed including protein and complex carbs. Patient has not been consistent with this diet but they are working towards it.     Review of Systems    Objective     There were no vitals taken for this visit.     Labs  Lab Results   Component Value Date    BILITOT 0.4 11/15/2024    CALCIUM 9.4 11/15/2024    CO2 24 11/15/2024    CL 97 (L) 11/15/2024    CREATININE 0.78 11/15/2024    GLUCOSE 219 (H) 11/15/2024    ALKPHOS 85 11/15/2024    K 4.1 11/15/2024    PROT 6.7 11/15/2024     11/15/2024    AST 14 11/15/2024    ALT 15 11/15/2024    BUN 11 11/15/2024    ANIONGAP 20 11/15/2024    ALBUMIN 3.9 11/15/2024     Lab Results   Component Value Date    TRIG 193 (H) 11/15/2024    CHOL 133 11/15/2024    LDLCALC 50 11/15/2024    HDL 44.0 11/15/2024     Lab Results   Component Value Date    HGBA1C 6.6 (H) 11/15/2024       Current Outpatient Medications on File Prior to Visit   Medication Sig Dispense Refill    aspirin 81 mg EC tablet Take 1 tablet (81 mg) by mouth once daily. 30 tablet 11    atorvastatin (Lipitor) 40 mg tablet Take 1 tablet (40 mg) by mouth once daily at bedtime. 90 tablet 1    buPROPion SR (Wellbutrin SR) 150 mg 12 hr tablet Take 1 tablet (150 mg) by mouth once daily. Do not crush, chew, or split. Take with 300 mg tablet for total dose 450 mg 30 tablet 5    buPROPion XL (Wellbutrin XL) 150 mg 24 hr tablet Take 1 tablet (150 mg) by mouth once  "daily in the morning. Do not crush, chew, or split. 30 tablet 5    buPROPion XL (Wellbutrin XL) 300 mg 24 hr tablet Take 1 tablet (300 mg) by mouth once daily. Do not crush, chew, or split. 90 tablet 1    clopidogrel (Plavix) 75 mg tablet Take 1 tablet (75 mg) by mouth once daily. 90 tablet 1    DULoxetine (Cymbalta) 60 mg DR capsule Take 1 capsule (60 mg) by mouth once daily. Do not crush or chew. 90 capsule 1    FreeStyle Tyshawn 2 Sensor kit USE as directed TO monitor blood glucose. replace every 14 DAYS.      glucose 4 gram chewable tablet Chew 4 tablets (16 g) if needed.      insulin glargine (Lantus Solostar U-100 Insulin) 100 unit/mL (3 mL) pen Inject 12 Units under the skin once daily at bedtime. 30 mL 1    lisinopril 40 mg tablet Take 1 tablet (40 mg) by mouth once daily. 90 tablet 3    LORazepam (Ativan) 0.5 mg tablet Take 1 to 2 tablets (0.5mg to 1mg) 30 minutes prior to flight 4 tablet 0    meclizine (Antivert) 25 mg tablet Take 0.5 tablets (12.5 mg) by mouth 3 times a day as needed for dizziness. 90 tablet 1    metFORMIN (Glucophage) 500 mg tablet Take 2 tablets (1,000 mg) by mouth 2 times a day. 360 tablet 3    metoclopramide (Reglan) 5 mg tablet Take 1 tablet (5 mg) by mouth 4 times a day. 360 tablet 1    oxybutynin XL (Ditropan-XL) 10 mg 24 hr tablet Take 1 tablet (10 mg) by mouth once daily at bedtime. Do not crush, chew, or split. 90 tablet 1    pen needle, diabetic (BD Conchita 2nd Gen Pen Needle) 32 gauge x 5/32\" needle Use one new needle once daily with Lantus 100 each 3     No current facility-administered medications on file prior to visit.        Assessment/Plan   Problem List Items Addressed This Visit             ICD-10-CM    Diabetes mellitus (Multi) E11.9    Relevant Orders    Referral to Clinical Pharmacy   A1c well-controlled at 6.6% Reporting less post meal hyperglycemia > 200. Cautiously continue 14 units Lantus daily and metformin.   Check every 8 hours for more improved record retention. "   Assess BG in 4 weeks  Follow up 1/6/25 @ 1500  PCP 12/16/24      Sylvia BoogieD    Continue all meds under the continuation of care with the referring provider and clinical pharmacy team.

## 2024-12-16 ENCOUNTER — APPOINTMENT (OUTPATIENT)
Dept: PRIMARY CARE | Facility: CLINIC | Age: 69
End: 2024-12-16
Payer: MEDICARE

## 2024-12-16 VITALS
HEIGHT: 66 IN | DIASTOLIC BLOOD PRESSURE: 82 MMHG | BODY MASS INDEX: 37 KG/M2 | WEIGHT: 230.2 LBS | HEART RATE: 89 BPM | SYSTOLIC BLOOD PRESSURE: 140 MMHG

## 2024-12-16 DIAGNOSIS — E78.00 PURE HYPERCHOLESTEROLEMIA: ICD-10-CM

## 2024-12-16 DIAGNOSIS — Z71.89 ADVANCE DIRECTIVE DISCUSSED WITH PATIENT: ICD-10-CM

## 2024-12-16 DIAGNOSIS — I47.29 NSVT (NONSUSTAINED VENTRICULAR TACHYCARDIA) (MULTI): ICD-10-CM

## 2024-12-16 DIAGNOSIS — Z12.12 SCREENING FOR COLORECTAL CANCER: ICD-10-CM

## 2024-12-16 DIAGNOSIS — Z79.4 TYPE 2 DIABETES MELLITUS WITH OTHER CIRCULATORY COMPLICATION, WITH LONG-TERM CURRENT USE OF INSULIN: ICD-10-CM

## 2024-12-16 DIAGNOSIS — Z95.0 PRESENCE OF CARDIAC PACEMAKER: ICD-10-CM

## 2024-12-16 DIAGNOSIS — E66.01 OBESITY, MORBID (MULTI): ICD-10-CM

## 2024-12-16 DIAGNOSIS — I10 PRIMARY HYPERTENSION: ICD-10-CM

## 2024-12-16 DIAGNOSIS — Z12.11 SCREENING FOR COLORECTAL CANCER: ICD-10-CM

## 2024-12-16 DIAGNOSIS — E11.59 TYPE 2 DIABETES MELLITUS WITH OTHER CIRCULATORY COMPLICATION, WITH LONG-TERM CURRENT USE OF INSULIN: ICD-10-CM

## 2024-12-16 DIAGNOSIS — E66.9 OBESITY WITH BODY MASS INDEX 30 OR GREATER: ICD-10-CM

## 2024-12-16 DIAGNOSIS — E55.9 VITAMIN D DEFICIENCY: ICD-10-CM

## 2024-12-16 DIAGNOSIS — Z00.00 ROUTINE GENERAL MEDICAL EXAMINATION AT HEALTH CARE FACILITY: ICD-10-CM

## 2024-12-16 DIAGNOSIS — Z00.00 HEALTHCARE MAINTENANCE: Primary | ICD-10-CM

## 2024-12-16 DIAGNOSIS — S06.9X9S TRAUMATIC BRAIN INJURY WITH LOSS OF CONSCIOUSNESS, SEQUELA (CMS-HCC): ICD-10-CM

## 2024-12-16 DIAGNOSIS — I73.89 OTHER SPECIFIED PERIPHERAL VASCULAR DISEASES: ICD-10-CM

## 2024-12-16 DIAGNOSIS — F33.9 DEPRESSION, RECURRENT (CMS-HCC): ICD-10-CM

## 2024-12-16 DIAGNOSIS — G47.33 OBSTRUCTIVE SLEEP APNEA SYNDROME: ICD-10-CM

## 2024-12-16 DIAGNOSIS — H91.90 HEARING LOSS, UNSPECIFIED HEARING LOSS TYPE, UNSPECIFIED LATERALITY: ICD-10-CM

## 2024-12-16 DIAGNOSIS — F41.9 ANXIETY: ICD-10-CM

## 2024-12-16 DIAGNOSIS — Z86.73 HISTORY OF CEREBROVASCULAR ACCIDENT: ICD-10-CM

## 2024-12-16 DIAGNOSIS — I10 BENIGN ESSENTIAL HYPERTENSION: ICD-10-CM

## 2024-12-16 DIAGNOSIS — Z85.3 HISTORY OF BREAST CANCER: ICD-10-CM

## 2024-12-16 PROBLEM — R59.0 CERVICAL LYMPHADENOPATHY: Status: RESOLVED | Noted: 2024-12-16 | Resolved: 2024-12-16

## 2024-12-16 PROBLEM — R55 SYNCOPE: Status: RESOLVED | Noted: 2023-07-20 | Resolved: 2024-12-16

## 2024-12-16 PROBLEM — M46.20 OSTEOMYELITIS OF SPINE (MULTI): Status: RESOLVED | Noted: 2024-12-16 | Resolved: 2024-12-16

## 2024-12-16 PROBLEM — J02.9 PHARYNGITIS: Status: RESOLVED | Noted: 2023-03-24 | Resolved: 2024-12-16

## 2024-12-16 PROBLEM — Z09 HOSPITAL DISCHARGE FOLLOW-UP: Status: RESOLVED | Noted: 2023-08-11 | Resolved: 2024-12-16

## 2024-12-16 PROBLEM — K59.00 CONSTIPATION: Status: RESOLVED | Noted: 2024-12-16 | Resolved: 2024-12-16

## 2024-12-16 PROBLEM — C50.919 BREAST CANCER (MULTI): Status: RESOLVED | Noted: 2023-03-24 | Resolved: 2024-12-16

## 2024-12-16 PROBLEM — C50.919 MALIGNANT NEOPLASM OF BREAST: Status: RESOLVED | Noted: 2024-12-16 | Resolved: 2024-12-16
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
## 2024-12-16 PROBLEM — R09.82 POST-NASAL DRIP: Status: RESOLVED | Noted: 2023-03-24 | Resolved: 2024-12-16

## 2024-12-16 PROBLEM — I95.9 HYPOTENSION: Status: RESOLVED | Noted: 2024-12-16 | Resolved: 2024-12-16

## 2024-12-16 PROBLEM — M25.569 KNEE PAIN: Status: RESOLVED | Noted: 2024-12-16 | Resolved: 2024-12-16

## 2024-12-16 PROBLEM — I63.9 CEREBROVASCULAR ACCIDENT (CVA) (MULTI): Status: RESOLVED | Noted: 2024-12-16 | Resolved: 2024-12-16

## 2024-12-16 PROBLEM — R13.10 DYSPHAGIA: Status: RESOLVED | Noted: 2024-12-16 | Resolved: 2024-12-16

## 2024-12-16 PROBLEM — I44.2 COMPLETE HEART BLOCK: Status: RESOLVED | Noted: 2021-01-06 | Resolved: 2024-12-16

## 2024-12-16 PROCEDURE — G0439 PPPS, SUBSEQ VISIT: HCPCS | Performed by: FAMILY MEDICINE

## 2024-12-16 PROCEDURE — 3044F HG A1C LEVEL LT 7.0%: CPT | Performed by: FAMILY MEDICINE

## 2024-12-16 PROCEDURE — 1160F RVW MEDS BY RX/DR IN RCRD: CPT | Performed by: FAMILY MEDICINE

## 2024-12-16 PROCEDURE — 3048F LDL-C <100 MG/DL: CPT | Performed by: FAMILY MEDICINE

## 2024-12-16 PROCEDURE — 1170F FXNL STATUS ASSESSED: CPT | Performed by: FAMILY MEDICINE

## 2024-12-16 PROCEDURE — 4010F ACE/ARB THERAPY RXD/TAKEN: CPT | Performed by: FAMILY MEDICINE

## 2024-12-16 PROCEDURE — 1157F ADVNC CARE PLAN IN RCRD: CPT | Performed by: FAMILY MEDICINE

## 2024-12-16 PROCEDURE — 99397 PER PM REEVAL EST PAT 65+ YR: CPT | Performed by: FAMILY MEDICINE

## 2024-12-16 PROCEDURE — 99214 OFFICE O/P EST MOD 30 MIN: CPT | Performed by: FAMILY MEDICINE

## 2024-12-16 PROCEDURE — 1036F TOBACCO NON-USER: CPT | Performed by: FAMILY MEDICINE

## 2024-12-16 PROCEDURE — 99497 ADVNCD CARE PLAN 30 MIN: CPT | Performed by: FAMILY MEDICINE

## 2024-12-16 PROCEDURE — 3008F BODY MASS INDEX DOCD: CPT | Performed by: FAMILY MEDICINE

## 2024-12-16 PROCEDURE — 3079F DIAST BP 80-89 MM HG: CPT | Performed by: FAMILY MEDICINE

## 2024-12-16 PROCEDURE — 1159F MED LIST DOCD IN RCRD: CPT | Performed by: FAMILY MEDICINE

## 2024-12-16 PROCEDURE — 3077F SYST BP >= 140 MM HG: CPT | Performed by: FAMILY MEDICINE

## 2024-12-16 PROCEDURE — 1123F ACP DISCUSS/DSCN MKR DOCD: CPT | Performed by: FAMILY MEDICINE

## 2024-12-16 PROCEDURE — 1158F ADVNC CARE PLAN TLK DOCD: CPT | Performed by: FAMILY MEDICINE

## 2024-12-16 RX ORDER — FLASH GLUCOSE SCANNING READER
EACH MISCELLANEOUS
COMMUNITY
Start: 2024-10-23

## 2024-12-16 ASSESSMENT — ENCOUNTER SYMPTOMS
FATIGUE: 0
LOSS OF SENSATION IN FEET: 1
OCCASIONAL FEELINGS OF UNSTEADINESS: 1
APPETITE CHANGE: 0
FEVER: 0
CHEST TIGHTNESS: 0
DEPRESSION: 0
COLOR CHANGE: 0
PALPITATIONS: 0
ACTIVITY CHANGE: 0
CHOKING: 0
BACK PAIN: 0
FACIAL ASYMMETRY: 0
LIGHT-HEADEDNESS: 0
ARTHRALGIAS: 0
COUGH: 0
DIZZINESS: 0
HEADACHES: 0

## 2024-12-16 ASSESSMENT — PATIENT HEALTH QUESTIONNAIRE - PHQ9
6. FEELING BAD ABOUT YOURSELF - OR THAT YOU ARE A FAILURE OR HAVE LET YOURSELF OR YOUR FAMILY DOWN: NOT AT ALL
7. TROUBLE CONCENTRATING ON THINGS, SUCH AS READING THE NEWSPAPER OR WATCHING TELEVISION: SEVERAL DAYS
2. FEELING DOWN, DEPRESSED OR HOPELESS: MORE THAN HALF THE DAYS
3. TROUBLE FALLING OR STAYING ASLEEP OR SLEEPING TOO MUCH: NEARLY EVERY DAY
5. POOR APPETITE OR OVEREATING: NOT AT ALL
SUM OF ALL RESPONSES TO PHQ9 QUESTIONS 1 AND 2: 3
10. IF YOU CHECKED OFF ANY PROBLEMS, HOW DIFFICULT HAVE THESE PROBLEMS MADE IT FOR YOU TO DO YOUR WORK, TAKE CARE OF THINGS AT HOME, OR GET ALONG WITH OTHER PEOPLE: NOT DIFFICULT AT ALL
1. LITTLE INTEREST OR PLEASURE IN DOING THINGS: SEVERAL DAYS
SUM OF ALL RESPONSES TO PHQ QUESTIONS 1-9: 10
9. THOUGHTS THAT YOU WOULD BE BETTER OFF DEAD, OR OF HURTING YOURSELF: NOT AT ALL
8. MOVING OR SPEAKING SO SLOWLY THAT OTHER PEOPLE COULD HAVE NOTICED. OR THE OPPOSITE, BEING SO FIGETY OR RESTLESS THAT YOU HAVE BEEN MOVING AROUND A LOT MORE THAN USUAL: NOT AT ALL
4. FEELING TIRED OR HAVING LITTLE ENERGY: NEARLY EVERY DAY

## 2024-12-16 ASSESSMENT — ACTIVITIES OF DAILY LIVING (ADL)
TAKING_MEDICATION: INDEPENDENT
BATHING: INDEPENDENT
GROCERY_SHOPPING: NEEDS ASSISTANCE
DOING_HOUSEWORK: NEEDS ASSISTANCE
DRESSING: INDEPENDENT
MANAGING_FINANCES: NEEDS ASSISTANCE

## 2024-12-16 NOTE — ACP (ADVANCE CARE PLANNING)
Confirming Previous Code Status:   Advance Care Planning Note     Discussion Date: 12/16/24   Discussion Participants: patient    The patient wishes to discuss Advance Care Planning today and the following is a brief summary of our discussion.     Patient has capacity to make their own medical decisions: Yes  Health Care Agent/Surrogate Decision Maker documented in chart: Yes    Documents on file and valid:  Advance Directive/Living Will: Yes   Health Care Power of : Yes  Other:     Communication of Medical Status/Prognosis:   stable    Communication of Treatment Goals/Options:   Stable    Treatment Decisions  Goals of Care: quality of life is prioritized; willing to accept low-burden treatments to achieve meaningful goals     Follow Up Plan  stable  Team Members  stable  Time Statement: Total face to face time spent on advance care planning was 5 minutes with 16 minutes spent in counseling, including the explanation.    Marleen Llanes DO  12/16/2024 1:28 PM

## 2024-12-16 NOTE — PROGRESS NOTES
Subjective   Reason for Visit: Glenna Delgado is an 69 y.o. female here for a Medicare Wellness visit.     Past Medical, Surgical, and Family History reviewed and updated in chart.    Reviewed all medications by prescribing practitioner or clinical pharmacist (such as prescriptions, OTCs, herbal therapies and supplements) and documented in the medical record.    HPI  Patient presents for physical exam.      Fam Hx  Mom (91) living, DMII, HTN, Hypothyroidism, lymphoma  Dad (84) , celiac disease  Brother () living, arthritis     Exercise walks  ETOH denies  Caffeine 20 oz diet Dr. Pepper/day  Tobacco denies     breast surgeon, Dr.. Francis Patel  ortho for knee, Dr. Delgado     Mammogram due 2024  DEXA 2023 normal due 2033  Colonoscopy  Dr. Lockhart (retired) due  consider cologuard     Patient denies other complaints.      Patient Self Assessment of Health Status  Patient Self Assessment: Fair    Nutrition and Exercise  Current Diet: Diabetic Diet  Adequate Fluid Intake: No  Caffeine: Yes  Exercise Frequency: No Exercise    Functional Ability/Level of Safety  Cognitive Impairment Observed: No cognitive impairment observed  Cognitive Impairment Reported: No cognitive impairment reported by patient or family    Home Safety Risk Factors: None    Patient Care Team:  Marleen Llanes DO as PCP - General  Marleen Llanes DO as PCP - [employer] PCP  Jami Akers RN as Care Manager (Case Management)     Review of Systems   Constitutional:  Negative for activity change, appetite change, fatigue and fever.   HENT:  Negative for congestion.    Respiratory:  Negative for cough, choking and chest tightness.    Cardiovascular:  Negative for chest pain, palpitations and leg swelling.   Musculoskeletal:  Negative for arthralgias, back pain and gait problem.   Skin:  Negative for color change and pallor.   Neurological:  Negative for dizziness, facial asymmetry, light-headedness and  "headaches.       Objective   Vitals:  /82 (BP Location: Left arm, Patient Position: Sitting)   Pulse 89   Ht 1.664 m (5' 5.5\")   Wt 104 kg (230 lb 3.2 oz)   BMI 37.72 kg/m²       Physical Exam  Constitutional:       General: She is not in acute distress.     Appearance: She is obese. She is not toxic-appearing.   HENT:      Head: Normocephalic.      Right Ear: Tympanic membrane, ear canal and external ear normal.      Left Ear: Tympanic membrane, ear canal and external ear normal.      Nose: Nose normal.      Mouth/Throat:      Pharynx: Oropharynx is clear.   Eyes:      Conjunctiva/sclera: Conjunctivae normal.      Pupils: Pupils are equal, round, and reactive to light.   Cardiovascular:      Rate and Rhythm: Normal rate and regular rhythm.      Pulses: Normal pulses.      Heart sounds: Normal heart sounds.   Pulmonary:      Effort: No respiratory distress.      Breath sounds: No wheezing, rhonchi or rales.   Abdominal:      General: Bowel sounds are normal. There is no distension.      Palpations: Abdomen is soft.      Tenderness: There is no abdominal tenderness.   Musculoskeletal:         General: No swelling or tenderness.      Cervical back: No tenderness.   Skin:     Findings: No lesion or rash.   Neurological:      General: No focal deficit present.      Mental Status: She is alert and oriented to person, place, and time. Mental status is at baseline.      Gait: Gait normal.   Psychiatric:         Mood and Affect: Mood normal.         Behavior: Behavior normal.         Assessment/Plan   Problem List Items Addressed This Visit       Depression, recurrent (CMS-HCC)    Current Assessment & Plan     stable         Type 2 diabetes mellitus    Overview     Comment on above: Added by Problem List Migration; 2013-9-22; Moved to [facility] Nov 24 2013 4:40PM;         Hearing loss    Hyperlipidemia    Hypertension    Obstructive sleep apnea syndrome    Presence of cardiac pacemaker    Vitamin D deficiency    " Anxiety    History of breast cancer    Overview     Formatting of this note might be different from the original. S/p partial mastectomy         History of cerebrovascular accident    Overview     With mild residual deficit on her right side.    Continue Plavix, ASA, and lipitor          NSVT (nonsustained ventricular tachycardia) (Multi)    Current Assessment & Plan     Pacemaker in place         Obesity with body mass index 30 or greater    Other specified peripheral vascular diseases    Current Assessment & Plan     Stable         Obesity, morbid (Multi)    Current Assessment & Plan     Stable, watching diet much better this year         Benign essential hypertension    Overview     Comment on above: Added by Problem List Migration; 2013-9-22;         Traumatic brain injury with loss of consciousness, sequela (CMS-HCC)    Current Assessment & Plan     stable          Other Visit Diagnoses       Healthcare maintenance    -  Primary    Routine general medical examination at health care facility        Relevant Orders    1 Year Follow Up In [facility] - PCP - Wellness Exam    Screening for colorectal cancer        Relevant Orders    Cologuard®    Advance directive discussed with patient [Z71.89]            1. Patient's blood work discussed at this office visit  2. Patient's LDL 50, goal LDL <70 secondary to diabetes on TYPE II diet  3. , goal TG <150, start TYPE IV diet  4. HgbA1c 6.6, goal HgbA1c <6.5, question about  5. Vitamin d level 16, goal vitamin d >30, start on vitamin d3 5,000 IU daily for lifetime  6. Mammogram due 12/2024  7. DEXA 12/2023 normal due 12/2033  8. Colonoscopy 2020 Dr. Lockhart (retired) due 2024 consider cologuard  9. Patient to call if questions or concerns.

## 2025-01-03 NOTE — PROGRESS NOTES
Subjective   Patient ID: Glenna Delgado is a 69 y.o. female who presents for Diabetes.    Referring Provider: Marleen Llanes,      Diabetes  She presents for her follow-up diabetic visit. She has type 2 diabetes mellitus. Her disease course has been stable.   Spoke to patient's  who stated that patient has been doing well with the Freestyle Tyshawn CGM.     From the last visit with the pharmacist, patient was educated on the types of food she should try to eat before bed including protein and complex carbs. Patient has not been consistent with this diet but they are working towards it.     Review of Systems    Objective     There were no vitals taken for this visit.     Labs  Lab Results   Component Value Date    BILITOT 0.4 11/15/2024    CALCIUM 9.4 11/15/2024    CO2 24 11/15/2024    CL 97 (L) 11/15/2024    CREATININE 0.78 11/15/2024    GLUCOSE 219 (H) 11/15/2024    ALKPHOS 85 11/15/2024    K 4.1 11/15/2024    PROT 6.7 11/15/2024     11/15/2024    AST 14 11/15/2024    ALT 15 11/15/2024    BUN 11 11/15/2024    ANIONGAP 20 11/15/2024    ALBUMIN 3.9 11/15/2024     Lab Results   Component Value Date    TRIG 193 (H) 11/15/2024    CHOL 133 11/15/2024    LDLCALC 50 11/15/2024    HDL 44.0 11/15/2024     Lab Results   Component Value Date    HGBA1C 6.6 (H) 11/15/2024       Current Outpatient Medications on File Prior to Visit   Medication Sig Dispense Refill    aspirin 81 mg EC tablet Take 1 tablet (81 mg) by mouth once daily. 30 tablet 11    atorvastatin (Lipitor) 40 mg tablet Take 1 tablet (40 mg) by mouth once daily at bedtime. 90 tablet 1    buPROPion SR (Wellbutrin SR) 150 mg 12 hr tablet Take 1 tablet (150 mg) by mouth once daily. Do not crush, chew, or split. Take with 300 mg tablet for total dose 450 mg 30 tablet 5    buPROPion XL (Wellbutrin XL) 150 mg 24 hr tablet Take 1 tablet (150 mg) by mouth once daily in the morning. Do not crush, chew, or split. 30 tablet 5    buPROPion XL (Wellbutrin XL) 300  "mg 24 hr tablet Take 1 tablet (300 mg) by mouth once daily. Do not crush, chew, or split. 90 tablet 1    clopidogrel (Plavix) 75 mg tablet Take 1 tablet (75 mg) by mouth once daily. 90 tablet 1    DULoxetine (Cymbalta) 60 mg DR capsule Take 1 capsule (60 mg) by mouth once daily. Do not crush or chew. 90 capsule 1    FreeStyle Tyshawn 2 [place] misc USE as directed TO monitor blood glucose with Tyshawn Sensor      FreeStyle Tyshawn 2 Sensor kit USE as directed TO monitor blood glucose. replace every 14 DAYS.      glucose 4 gram chewable tablet Chew 4 tablets (16 g) if needed.      insulin glargine (Lantus Solostar U-100 Insulin) 100 unit/mL (3 mL) pen Inject 12 Units under the skin once daily at bedtime. (Patient taking differently: Inject 14 Units under the skin once daily at bedtime.) 30 mL 1    lisinopril 40 mg tablet Take 1 tablet (40 mg) by mouth once daily. 90 tablet 3    LORazepam (Ativan) 0.5 mg tablet Take 1 to 2 tablets (0.5mg to 1mg) 30 minutes prior to flight 4 tablet 0    meclizine (Antivert) 25 mg tablet Take 0.5 tablets (12.5 mg) by mouth 3 times a day as needed for dizziness. 90 tablet 1    metFORMIN (Glucophage) 500 mg tablet Take 2 tablets (1,000 mg) by mouth 2 times a day. 360 tablet 3    metoclopramide (Reglan) 5 mg tablet Take 1 tablet (5 mg) by mouth 4 times a day. 360 tablet 1    oxybutynin XL (Ditropan-XL) 10 mg 24 hr tablet Take 1 tablet (10 mg) by mouth once daily at bedtime. Do not crush, chew, or split. 90 tablet 1    pen needle, diabetic (BD Conchita 2nd Gen Pen Needle) 32 gauge x 5/32\" needle Use one new needle once daily with Lantus 100 each 3     No current facility-administered medications on file prior to visit.        Assessment/Plan   Problem List Items Addressed This Visit             ICD-10-CM    Type 2 diabetes mellitus - Primary E11.9    Relevant Orders    Referral to Clinical Pharmacy     ASSESSMENT:    Current Pharmacotherapy:  Lantus 14 units daily  Metformin 1000 mg BID    A1c " well-controlled at 6.6% Reporting less post meal hyperglycemia > 200.   Spoke to patient's  who reported an average 7 days BG of 141 mg/dL, which is improved from 7 day average at last visit (151 mg/dL). Reported 14-day average 149. No hypoglycemia symptoms reported. Discussed that post meal BG > 200 may be due to high carb content in cereal.  Due to improvement in BG average, will continue therapy without adjustment and assess if Lantus dose increase is necessary in 4 weeks.      PLAN:  Continue:  Lantus 14 units daily  Metformin 1000 mg BID      Follow Up: 2/3/25 @ 3:00      Karen Griffin, PharmD    Continue all meds under the continuation of care with the referring provider and clinical pharmacy team.

## 2025-01-06 ENCOUNTER — APPOINTMENT (OUTPATIENT)
Dept: PHARMACY | Facility: HOSPITAL | Age: 70
End: 2025-01-06
Payer: MEDICARE

## 2025-01-06 DIAGNOSIS — Z79.4 TYPE 2 DIABETES MELLITUS WITH OTHER CIRCULATORY COMPLICATION, WITH LONG-TERM CURRENT USE OF INSULIN: Primary | ICD-10-CM

## 2025-01-06 DIAGNOSIS — E11.59 TYPE 2 DIABETES MELLITUS WITH OTHER CIRCULATORY COMPLICATION, WITH LONG-TERM CURRENT USE OF INSULIN: Primary | ICD-10-CM

## 2025-01-24 ENCOUNTER — TELEPHONE (OUTPATIENT)
Dept: PRIMARY CARE | Facility: CLINIC | Age: 70
End: 2025-01-24
Payer: MEDICARE

## 2025-01-24 ENCOUNTER — PATIENT OUTREACH (OUTPATIENT)
Dept: PRIMARY CARE | Facility: CLINIC | Age: 70
End: 2025-01-24
Payer: MEDICARE

## 2025-01-24 DIAGNOSIS — E11.59 TYPE 2 DIABETES MELLITUS WITH OTHER CIRCULATORY COMPLICATION, WITH LONG-TERM CURRENT USE OF INSULIN: ICD-10-CM

## 2025-01-24 DIAGNOSIS — Z79.4 TYPE 2 DIABETES MELLITUS WITH OTHER CIRCULATORY COMPLICATION, WITH LONG-TERM CURRENT USE OF INSULIN: ICD-10-CM

## 2025-01-24 DIAGNOSIS — I10 PRIMARY HYPERTENSION: ICD-10-CM

## 2025-01-24 DIAGNOSIS — E55.9 VITAMIN D DEFICIENCY: ICD-10-CM

## 2025-01-24 RX ORDER — CHOLECALCIFEROL (VITAMIN D3) 50 MCG
2000 TABLET ORAL DAILY
COMMUNITY

## 2025-01-24 NOTE — PROGRESS NOTES
Patient began taking Vitamin D medication per PCP's order for a low vitamin D level. The call regarding her results that occurred before her yearly physical said to start taking 2,000 units a day. But then at her appt, it was documented in the plan to take 5,000 units a day. So which one is she supposed to be taking? She is currently only taking 2,000 because that is what they were told. I just want to clarify and make sure she is taking what PCP really wants. Please advise.

## 2025-01-24 NOTE — PROGRESS NOTES
Chart review completed prior to [facility] outreach. Call completed to patient.     Spoke to patient's  Alessandro. Patient is doing pretty well at this time. She has been working with [facility] regarding her blood sugars which have stabilized. She is not getting really high readings or really low readings. She is on 14 units of Lantus which seems to be working well. She is scheduled for another follow up with them on 2/3/25.    Patient had her annual mammogram completed, but it had to be changed to a diagnostic mammogram due to a lump she felt. I reviewed the result with Alessandro and it does state that they did an US and looked at the area of concern and did not find any abnormal finding to correlate with what she felt. They just see normal breast tissue. He appreciated that we reviewed that.     We discussed that she was ordered a Cologuard test and she did receive this. She has not completed it yet, but I gently reminded them to please complete at their earliest convenience.     We discussed how her Vitamin D level was really low on her BW. Dr. Llanes prior to the appt recommended 2,000 units of Vitamin D daily, but in her note, it states to take 5,000 units daily. She is currently only taking 2,000 units daily, so I will clarify with Dr. Llanes which one she wants her to be on. We did talk about patient's mood not being great. She struggles with motivation to do things. She saw psychiatry years ago but doesn't follow up with anyone currently. Her recent PHQ9 was a 10. I don't know if that was a concern for the PCP or not. I can always suggest [facility] services in the future if that is something he thinks she would even participate in.     Overall Mr. Delgado was appreciative of the outreach call and for making sure Glenna doesn't get lost to follow up.

## 2025-01-31 NOTE — PROGRESS NOTES
Subjective   Patient ID: Glenna Delgado is a 69 y.o. female who presents for Diabetes.    Referring Provider: Marleen Llanes,      Diabetes  She presents for her follow-up diabetic visit. She has type 2 diabetes mellitus. Her disease course has been stable.   Spoke to patient's  who stated that patient has been doing well with the Freestyle Tyshawn CGM.     From the last visit with the pharmacist, patient was educated on the types of food she should try to eat before bed including protein and complex carbs. Patient has not been consistent with this diet but they are working towards it.     Review of Systems    Objective     There were no vitals taken for this visit.     Labs  Lab Results   Component Value Date    BILITOT 0.4 11/15/2024    CALCIUM 9.4 11/15/2024    CO2 24 11/15/2024    CL 97 (L) 11/15/2024    CREATININE 0.78 11/15/2024    GLUCOSE 219 (H) 11/15/2024    ALKPHOS 85 11/15/2024    K 4.1 11/15/2024    PROT 6.7 11/15/2024     11/15/2024    AST 14 11/15/2024    ALT 15 11/15/2024    BUN 11 11/15/2024    ANIONGAP 20 11/15/2024    ALBUMIN 3.9 11/15/2024     Lab Results   Component Value Date    TRIG 193 (H) 11/15/2024    CHOL 133 11/15/2024    LDLCALC 50 11/15/2024    HDL 44.0 11/15/2024     Lab Results   Component Value Date    HGBA1C 6.6 (H) 11/15/2024       Current Outpatient Medications on File Prior to Visit   Medication Sig Dispense Refill    aspirin 81 mg EC tablet Take 1 tablet (81 mg) by mouth once daily. 30 tablet 11    atorvastatin (Lipitor) 40 mg tablet Take 1 tablet (40 mg) by mouth once daily at bedtime. 90 tablet 1    buPROPion SR (Wellbutrin SR) 150 mg 12 hr tablet Take 1 tablet (150 mg) by mouth once daily. Do not crush, chew, or split. Take with 300 mg tablet for total dose 450 mg 30 tablet 5    buPROPion XL (Wellbutrin XL) 150 mg 24 hr tablet Take 1 tablet (150 mg) by mouth once daily in the morning. Do not crush, chew, or split. 30 tablet 5    buPROPion XL (Wellbutrin XL) 300  "mg 24 hr tablet Take 1 tablet (300 mg) by mouth once daily. Do not crush, chew, or split. 90 tablet 1    cholecalciferol (Vitamin D3) 50 MCG (2000 UT) tablet Take 1 tablet (2,000 Units) by mouth once daily.      clopidogrel (Plavix) 75 mg tablet Take 1 tablet (75 mg) by mouth once daily. 90 tablet 1    DULoxetine (Cymbalta) 60 mg DR capsule Take 1 capsule (60 mg) by mouth once daily. Do not crush or chew. 90 capsule 1    FreeStyle Tyshawn 2 [place] misc USE as directed TO monitor blood glucose with Tyshawn Sensor      FreeStyle Tyshawn 2 Sensor kit USE as directed TO monitor blood glucose. replace every 14 DAYS.      glucose 4 gram chewable tablet Chew 4 tablets (16 g) if needed.      insulin glargine (Lantus Solostar U-100 Insulin) 100 unit/mL (3 mL) pen Inject 12 Units under the skin once daily at bedtime. (Patient taking differently: Inject 14 Units under the skin once daily at bedtime.) 30 mL 1    lisinopril 40 mg tablet Take 1 tablet (40 mg) by mouth once daily. 90 tablet 3    LORazepam (Ativan) 0.5 mg tablet Take 1 to 2 tablets (0.5mg to 1mg) 30 minutes prior to flight 4 tablet 0    meclizine (Antivert) 25 mg tablet Take 0.5 tablets (12.5 mg) by mouth 3 times a day as needed for dizziness. 90 tablet 1    metFORMIN (Glucophage) 500 mg tablet Take 2 tablets (1,000 mg) by mouth 2 times a day. 360 tablet 3    metoclopramide (Reglan) 5 mg tablet Take 1 tablet (5 mg) by mouth 4 times a day. 360 tablet 1    oxybutynin XL (Ditropan-XL) 10 mg 24 hr tablet Take 1 tablet (10 mg) by mouth once daily at bedtime. Do not crush, chew, or split. 90 tablet 1    pen needle, diabetic (BD Conchita 2nd Gen Pen Needle) 32 gauge x 5/32\" needle Use one new needle once daily with Lantus 100 each 3     No current facility-administered medications on file prior to visit.        Assessment/Plan   Problem List Items Addressed This Visit             ICD-10-CM    Type 2 diabetes mellitus E11.9    Relevant Orders    Referral to Clinical Pharmacy "       ASSESSMENT:    Current Pharmacotherapy:  Lantus 14 units daily  Metformin 1000 mg BID    A1c well-controlled at 6.6% Reporting less post meal hyperglycemia > 200.   BG > 200 only 4 times in last month. No instances of hypoglycemia reported.    Spoke to patient's  who reported an average 7 days BG of 144 mg/dL and 14-day average 148., which is stable from last visit. Discussed how carb content in meal can effect BG and cause spikes over 200.      PLAN:  Continue:  Lantus 14 units daily  Metformin 1000 mg BID      Follow Up: 3/4/25 @ 3:00      Karen Griffin, PharmD    Continue all meds under the continuation of care with the referring provider and clinical pharmacy team.

## 2025-02-03 ENCOUNTER — APPOINTMENT (OUTPATIENT)
Dept: PHARMACY | Facility: HOSPITAL | Age: 70
End: 2025-02-03
Payer: MEDICARE

## 2025-02-03 DIAGNOSIS — Z79.4 TYPE 2 DIABETES MELLITUS WITH OTHER CIRCULATORY COMPLICATION, WITH LONG-TERM CURRENT USE OF INSULIN: ICD-10-CM

## 2025-02-03 DIAGNOSIS — E11.59 TYPE 2 DIABETES MELLITUS WITH OTHER CIRCULATORY COMPLICATION, WITH LONG-TERM CURRENT USE OF INSULIN: ICD-10-CM

## 2025-02-17 DIAGNOSIS — R32 URINARY INCONTINENCE, UNSPECIFIED TYPE: ICD-10-CM

## 2025-02-17 RX ORDER — OXYBUTYNIN CHLORIDE 10 MG/1
10 TABLET, EXTENDED RELEASE ORAL NIGHTLY
Qty: 90 TABLET | Refills: 1 | Status: SHIPPED | OUTPATIENT
Start: 2025-02-17

## 2025-02-25 ENCOUNTER — PATIENT OUTREACH (OUTPATIENT)
Dept: PRIMARY CARE | Facility: CLINIC | Age: 70
End: 2025-02-25
Payer: MEDICARE

## 2025-02-25 DIAGNOSIS — I10 PRIMARY HYPERTENSION: ICD-10-CM

## 2025-02-25 DIAGNOSIS — E11.59 TYPE 2 DIABETES MELLITUS WITH OTHER CIRCULATORY COMPLICATION, WITH LONG-TERM CURRENT USE OF INSULIN: ICD-10-CM

## 2025-02-25 DIAGNOSIS — Z79.4 TYPE 2 DIABETES MELLITUS WITH OTHER CIRCULATORY COMPLICATION, WITH LONG-TERM CURRENT USE OF INSULIN: ICD-10-CM

## 2025-02-25 PROCEDURE — 99490 CHRNC CARE MGMT STAFF 1ST 20: CPT | Performed by: FAMILY MEDICINE

## 2025-02-25 NOTE — PROGRESS NOTES
Per Dr. Llanes, she states patient should be seen every 3 months, so we made an appt for 3/18 with Dr. Llanes.

## 2025-02-25 NOTE — PROGRESS NOTES
"Chart review completed prior to [facility] outreach. Call completed to patient's .     Patient is doing well at this time. She continues to work with [facility] pharmacy regarding her glucose levels. According to her  her sugars are doing pretty well. \"Status Quo\". She has a telehealth appt with pharmacy next week on 3/4.     We discussed the cologuard test again that was ordered. They have it. They have opened it. Now he just needs her to let him know when she needs to go so he can help set everything up. They will continue to try to remember to complete this.     Alessandro did ask when patient needs to be seen next by PCP. I looked at her last office note and there is no mention of next appt. With her diagnoses I would think 6 months to 1 year. I will check with PCP though and let him know.   "

## 2025-03-03 NOTE — PROGRESS NOTES
Subjective   Patient ID: Glenna Delgado is a 69 y.o. female who presents for Diabetes.    Referring Provider: Marleen Llanes,  Next visit with PCP: 3/18/25    Diabetes  She presents for her follow-up diabetic visit. She has type 2 diabetes mellitus. Her disease course has been stable.   Spoke to patient's  who stated that patient has been doing well with the Freestyle Tyshawn CGM.     From the last visit with the pharmacist, patient was educated on the types of food she should try to eat before bed including protein and complex carbs. Patient has not been consistent with this diet but they are working towards it.     Review of Systems    Objective     There were no vitals taken for this visit.     Labs  Lab Results   Component Value Date    BILITOT 0.4 11/15/2024    CALCIUM 9.4 11/15/2024    CO2 24 11/15/2024    CL 97 (L) 11/15/2024    CREATININE 0.78 11/15/2024    GLUCOSE 219 (H) 11/15/2024    ALKPHOS 85 11/15/2024    K 4.1 11/15/2024    PROT 6.7 11/15/2024     11/15/2024    AST 14 11/15/2024    ALT 15 11/15/2024    BUN 11 11/15/2024    ANIONGAP 20 11/15/2024    ALBUMIN 3.9 11/15/2024     Lab Results   Component Value Date    TRIG 193 (H) 11/15/2024    CHOL 133 11/15/2024    LDLCALC 50 11/15/2024    HDL 44.0 11/15/2024     Lab Results   Component Value Date    HGBA1C 6.6 (H) 11/15/2024       Current Outpatient Medications on File Prior to Visit   Medication Sig Dispense Refill    aspirin 81 mg EC tablet Take 1 tablet (81 mg) by mouth once daily. 30 tablet 11    atorvastatin (Lipitor) 40 mg tablet Take 1 tablet (40 mg) by mouth once daily at bedtime. 90 tablet 1    buPROPion SR (Wellbutrin SR) 150 mg 12 hr tablet Take 1 tablet (150 mg) by mouth once daily. Do not crush, chew, or split. Take with 300 mg tablet for total dose 450 mg 30 tablet 5    buPROPion XL (Wellbutrin XL) 150 mg 24 hr tablet Take 1 tablet (150 mg) by mouth once daily in the morning. Do not crush, chew, or split. 30 tablet 5     "buPROPion XL (Wellbutrin XL) 300 mg 24 hr tablet Take 1 tablet (300 mg) by mouth once daily. Do not crush, chew, or split. 90 tablet 1    cholecalciferol (Vitamin D3) 50 MCG (2000 UT) tablet Take 1 tablet (2,000 Units) by mouth once daily.      clopidogrel (Plavix) 75 mg tablet Take 1 tablet (75 mg) by mouth once daily. 90 tablet 1    DULoxetine (Cymbalta) 60 mg DR capsule Take 1 capsule (60 mg) by mouth once daily. Do not crush or chew. 90 capsule 1    FreeStyle Tyshawn 2 [place] misc USE as directed TO monitor blood glucose with Tyshawn Sensor      FreeStyle Tyshawn 2 Sensor kit USE as directed TO monitor blood glucose. replace every 14 DAYS.      glucose 4 gram chewable tablet Chew 4 tablets (16 g) if needed.      insulin glargine (Lantus Solostar U-100 Insulin) 100 unit/mL (3 mL) pen Inject 12 Units under the skin once daily at bedtime. (Patient taking differently: Inject 14 Units under the skin once daily at bedtime.) 30 mL 1    lisinopril 40 mg tablet Take 1 tablet (40 mg) by mouth once daily. 90 tablet 3    LORazepam (Ativan) 0.5 mg tablet Take 1 to 2 tablets (0.5mg to 1mg) 30 minutes prior to flight 4 tablet 0    meclizine (Antivert) 25 mg tablet Take 0.5 tablets (12.5 mg) by mouth 3 times a day as needed for dizziness. 90 tablet 1    metFORMIN (Glucophage) 500 mg tablet Take 2 tablets (1,000 mg) by mouth 2 times a day. 360 tablet 3    metoclopramide (Reglan) 5 mg tablet Take 1 tablet (5 mg) by mouth 4 times a day. 360 tablet 1    oxybutynin XL (Ditropan-XL) 10 mg 24 hr tablet Take 1 tablet (10 mg) by mouth once daily at bedtime. Do not crush, chew, or split. 90 tablet 1    pen needle, diabetic (BD Conchita 2nd Gen Pen Needle) 32 gauge x 5/32\" needle Use one new needle once daily with Lantus 100 each 3     No current facility-administered medications on file prior to visit.        Assessment/Plan   Problem List Items Addressed This Visit             ICD-10-CM    Type 2 diabetes mellitus - Primary E11.9    Relevant " Orders    Referral to Clinical Pharmacy         ASSESSMENT:    Current Pharmacotherapy:  Lantus 14 units daily  Metformin 1000 mg BID    A1c well-controlled at 6.6% Reporting less post meal hyperglycemia > 200.     7 day avg 143  14 day avg 140     Last 7 days:  TIR 84%  Above: 16%  Below: 0%    Last 14 days:  TIR : 86%  Above: 14%  Below: 0%    Counseling provided:  - Reviewed TIR goal of 70%  - Reviewed correlation of carb content in meals with postprandial spikes > 200, advised to continue to be conscious of dietary choices  - A1c check due mid-May      PLAN:  Continue:  Lantus 14 units daily  Metformin 1000 mg BID    PCP Follow Up: 3/18/25  Clinical Pharmacy Follow Up: 6/3/25 @ 3:00      Karen Griffin, PharmD    Continue all meds under the continuation of care with the referring provider and clinical pharmacy team.

## 2025-03-04 ENCOUNTER — APPOINTMENT (OUTPATIENT)
Dept: PHARMACY | Facility: HOSPITAL | Age: 70
End: 2025-03-04
Payer: MEDICARE

## 2025-03-04 DIAGNOSIS — Z79.4 TYPE 2 DIABETES MELLITUS WITH OTHER CIRCULATORY COMPLICATION, WITH LONG-TERM CURRENT USE OF INSULIN: Primary | ICD-10-CM

## 2025-03-04 DIAGNOSIS — E11.59 TYPE 2 DIABETES MELLITUS WITH OTHER CIRCULATORY COMPLICATION, WITH LONG-TERM CURRENT USE OF INSULIN: Primary | ICD-10-CM

## 2025-03-18 ENCOUNTER — APPOINTMENT (OUTPATIENT)
Dept: PRIMARY CARE | Facility: CLINIC | Age: 70
End: 2025-03-18
Payer: MEDICARE

## 2025-03-18 VITALS
WEIGHT: 224.2 LBS | BODY MASS INDEX: 36.74 KG/M2 | SYSTOLIC BLOOD PRESSURE: 124 MMHG | HEART RATE: 85 BPM | DIASTOLIC BLOOD PRESSURE: 74 MMHG

## 2025-03-18 DIAGNOSIS — I10 PRIMARY HYPERTENSION: ICD-10-CM

## 2025-03-18 DIAGNOSIS — R32 URINARY INCONTINENCE, UNSPECIFIED TYPE: ICD-10-CM

## 2025-03-18 DIAGNOSIS — E11.59 TYPE 2 DIABETES MELLITUS WITH OTHER CIRCULATORY COMPLICATION, WITH LONG-TERM CURRENT USE OF INSULIN: Primary | ICD-10-CM

## 2025-03-18 DIAGNOSIS — Z79.4 TYPE 2 DIABETES MELLITUS WITH OTHER CIRCULATORY COMPLICATION, WITH LONG-TERM CURRENT USE OF INSULIN: Primary | ICD-10-CM

## 2025-03-18 DIAGNOSIS — G47.33 OBSTRUCTIVE SLEEP APNEA SYNDROME: ICD-10-CM

## 2025-03-18 PROCEDURE — 1160F RVW MEDS BY RX/DR IN RCRD: CPT | Performed by: FAMILY MEDICINE

## 2025-03-18 PROCEDURE — 99214 OFFICE O/P EST MOD 30 MIN: CPT | Performed by: FAMILY MEDICINE

## 2025-03-18 PROCEDURE — 1036F TOBACCO NON-USER: CPT | Performed by: FAMILY MEDICINE

## 2025-03-18 PROCEDURE — 3078F DIAST BP <80 MM HG: CPT | Performed by: FAMILY MEDICINE

## 2025-03-18 PROCEDURE — 1157F ADVNC CARE PLAN IN RCRD: CPT | Performed by: FAMILY MEDICINE

## 2025-03-18 PROCEDURE — 4010F ACE/ARB THERAPY RXD/TAKEN: CPT | Performed by: FAMILY MEDICINE

## 2025-03-18 PROCEDURE — 1123F ACP DISCUSS/DSCN MKR DOCD: CPT | Performed by: FAMILY MEDICINE

## 2025-03-18 PROCEDURE — 3074F SYST BP LT 130 MM HG: CPT | Performed by: FAMILY MEDICINE

## 2025-03-18 PROCEDURE — 1159F MED LIST DOCD IN RCRD: CPT | Performed by: FAMILY MEDICINE

## 2025-03-18 ASSESSMENT — PATIENT HEALTH QUESTIONNAIRE - PHQ9
2. FEELING DOWN, DEPRESSED OR HOPELESS: NEARLY EVERY DAY
1. LITTLE INTEREST OR PLEASURE IN DOING THINGS: NEARLY EVERY DAY
9. THOUGHTS THAT YOU WOULD BE BETTER OFF DEAD, OR OF HURTING YOURSELF: NOT AT ALL
SUM OF ALL RESPONSES TO PHQ9 QUESTIONS 1 AND 2: 6
SUM OF ALL RESPONSES TO PHQ QUESTIONS 1-9: 21
3. TROUBLE FALLING OR STAYING ASLEEP OR SLEEPING TOO MUCH: NEARLY EVERY DAY
8. MOVING OR SPEAKING SO SLOWLY THAT OTHER PEOPLE COULD HAVE NOTICED. OR THE OPPOSITE, BEING SO FIGETY OR RESTLESS THAT YOU HAVE BEEN MOVING AROUND A LOT MORE THAN USUAL: NEARLY EVERY DAY
10. IF YOU CHECKED OFF ANY PROBLEMS, HOW DIFFICULT HAVE THESE PROBLEMS MADE IT FOR YOU TO DO YOUR WORK, TAKE CARE OF THINGS AT HOME, OR GET ALONG WITH OTHER PEOPLE: SOMEWHAT DIFFICULT
7. TROUBLE CONCENTRATING ON THINGS, SUCH AS READING THE NEWSPAPER OR WATCHING TELEVISION: NEARLY EVERY DAY
5. POOR APPETITE OR OVEREATING: NOT AT ALL
6. FEELING BAD ABOUT YOURSELF - OR THAT YOU ARE A FAILURE OR HAVE LET YOURSELF OR YOUR FAMILY DOWN: NEARLY EVERY DAY
4. FEELING TIRED OR HAVING LITTLE ENERGY: NEARLY EVERY DAY

## 2025-03-18 ASSESSMENT — ENCOUNTER SYMPTOMS
FATIGUE: 0
APPETITE CHANGE: 0
COUGH: 0
PALPITATIONS: 0
CHEST TIGHTNESS: 0
OCCASIONAL FEELINGS OF UNSTEADINESS: 1
DIZZINESS: 0
FEVER: 0
FACIAL ASYMMETRY: 0
ARTHRALGIAS: 0
CHOKING: 0
DEPRESSION: 1
LOSS OF SENSATION IN FEET: 1
ACTIVITY CHANGE: 0
HEADACHES: 0
BACK PAIN: 0
LIGHT-HEADEDNESS: 0
COLOR CHANGE: 0

## 2025-03-18 NOTE — PROGRESS NOTES
Subjective   Patient ID: Glenna Delgado is a 69 y.o. female who presents for 3 month follow up .    HPI   She reports she is here to follow-up on her diabetes.  Patient has been doing well with her CGM.  Patient was encouraged to increase protein.  Reports there is a lot of inconsistency with diet.  Patient has been taking her Lantus metformin.    Per  is sleeping a lot. She does have a cpap but has lost weight since getting her machine.     Review of Systems   Constitutional:  Negative for activity change, appetite change, fatigue and fever.   HENT:  Negative for congestion.    Respiratory:  Negative for cough, choking and chest tightness.    Cardiovascular:  Negative for chest pain, palpitations and leg swelling.   Musculoskeletal:  Negative for arthralgias, back pain and gait problem.   Skin:  Negative for color change and pallor.   Neurological:  Negative for dizziness, facial asymmetry, light-headedness and headaches.       Objective   /74 (BP Location: Right arm, Patient Position: Sitting)   Pulse 85   Wt 102 kg (224 lb 3.2 oz)   BMI 36.74 kg/m²   BSA Body surface area is 2.17 meters squared.      Physical Exam  Constitutional:       General: She is not in acute distress.     Appearance: Normal appearance. She is not toxic-appearing.   HENT:      Head: Normocephalic.      Right Ear: Tympanic membrane, ear canal and external ear normal.      Left Ear: Tympanic membrane, ear canal and external ear normal.   Eyes:      Conjunctiva/sclera: Conjunctivae normal.      Pupils: Pupils are equal, round, and reactive to light.   Cardiovascular:      Rate and Rhythm: Normal rate and regular rhythm.      Pulses: Normal pulses.      Heart sounds: Normal heart sounds.   Pulmonary:      Effort: No respiratory distress.      Breath sounds: No wheezing, rhonchi or rales.   Musculoskeletal:         General: No swelling or tenderness.   Skin:     Findings: No lesion or rash.   Neurological:      General: No focal  deficit present.      Mental Status: She is alert and oriented to person, place, and time. Mental status is at baseline.   Psychiatric:         Mood and Affect: Mood normal.         Behavior: Behavior normal.         Thought Content: Thought content normal.         Judgment: Judgment normal.       Lab on 11/15/2024   Component Date Value Ref Range Status    WBC 11/15/2024 8.8  4.4 - 11.3 x10*3/uL Final    nRBC 11/15/2024 0.0  0.0 - 0.0 /100 WBCs Final    RBC 11/15/2024 4.46  4.00 - 5.20 x10*6/uL Final    Hemoglobin 11/15/2024 12.3  12.0 - 16.0 g/dL Final    Hematocrit 11/15/2024 40.3  36.0 - 46.0 % Final    MCV 11/15/2024 90  80 - 100 fL Final    MCH 11/15/2024 27.6  26.0 - 34.0 pg Final    MCHC 11/15/2024 30.5 (L)  32.0 - 36.0 g/dL Final    RDW 11/15/2024 14.5  11.5 - 14.5 % Final    Platelets 11/15/2024 273  150 - 450 x10*3/uL Final    Neutrophils % 11/15/2024 69.3  40.0 - 80.0 % Final    Immature Granulocytes %, Automated 11/15/2024 1.0 (H)  0.0 - 0.9 % Final    Immature Granulocyte Count (IG) includes promyelocytes, myelocytes and metamyelocytes but does not include bands. Percent differential counts (%) should be interpreted in the context of the absolute cell counts (cells/UL).    Lymphocytes % 11/15/2024 21.8  13.0 - 44.0 % Final    Monocytes % 11/15/2024 5.8  2.0 - 10.0 % Final    Eosinophils % 11/15/2024 1.4  0.0 - 6.0 % Final    Basophils % 11/15/2024 0.7  0.0 - 2.0 % Final    Neutrophils Absolute 11/15/2024 6.07  1.20 - 7.70 x10*3/uL Final    Percent differential counts (%) should be interpreted in the context of the absolute cell counts (cells/uL).    Immature Granulocytes Absolute, Au* 11/15/2024 0.09  0.00 - 0.70 x10*3/uL Final    Lymphocytes Absolute 11/15/2024 1.91  1.20 - 4.80 x10*3/uL Final    Monocytes Absolute 11/15/2024 0.51  0.10 - 1.00 x10*3/uL Final    Eosinophils Absolute 11/15/2024 0.12  0.00 - 0.70 x10*3/uL Final    Basophils Absolute 11/15/2024 0.06  0.00 - 0.10 x10*3/uL Final    Glucose  11/15/2024 219 (H)  74 - 99 mg/dL Final    Sodium 11/15/2024 137  136 - 145 mmol/L Final    Potassium 11/15/2024 4.1  3.5 - 5.3 mmol/L Final    Chloride 11/15/2024 97 (L)  98 - 107 mmol/L Final    Bicarbonate 11/15/2024 24  21 - 32 mmol/L Final    Anion Gap 11/15/2024 20  10 - 20 mmol/L Final    Urea Nitrogen 11/15/2024 11  6 - 23 mg/dL Final    Creatinine 11/15/2024 0.78  0.50 - 1.05 mg/dL Final    eGFR 11/15/2024 82  >60 mL/min/1.73m*2 Final    Calculations of estimated GFR are performed using the 2021 CKD-EPI Study Refit equation without the race variable for the IDMS-Traceable creatinine methods.  https://jasn.asnjournals.org/content/early/2021/09/22/ASN.6051457208    Calcium 11/15/2024 9.4  8.6 - 10.6 mg/dL Final    Albumin 11/15/2024 3.9  3.4 - 5.0 g/dL Final    Alkaline Phosphatase 11/15/2024 85  33 - 136 U/L Final    Total Protein 11/15/2024 6.7  6.4 - 8.2 g/dL Final    AST 11/15/2024 14  9 - 39 U/L Final    Bilirubin, Total 11/15/2024 0.4  0.0 - 1.2 mg/dL Final    ALT 11/15/2024 15  7 - 45 U/L Final    Patients treated with Sulfasalazine may generate falsely decreased results for ALT.    Cholesterol 11/15/2024 133  0 - 199 mg/dL Final          Age      Desirable   Borderline High   High     0-19 Y     0 - 169       170 - 199     >/= 200    20-24 Y     0 - 189       190 - 224     >/= 225         >24 Y     0 - 199       200 - 239     >/= 240   **All ranges are based on fasting samples. Specific   therapeutic targets will vary based on patient-specific   cardiac risk.    Pediatric guidelines reference:Pediatrics 2011, 128(S5).Adult guidelines reference: NCEP ATPIII Guidelines,KAREEM 2001, 258:2486-97    Venipuncture immediately after or during the administration of Metamizole may lead to falsely low results. Testing should be performed immediately prior to Metamizole dosing.    HDL-Cholesterol 11/15/2024 44.0  mg/dL Final      Age       Very Low   Low     Normal    High    0-19 Y    < 35      < 40     40-45      ----  20-24 Y    ----     < 40      >45      ----        >24 Y      ----     < 40     40-60      >60      Cholesterol/HDL Ratio 11/15/2024 3.0   Final      Ref Values  Desirable  < 3.4  High Risk  > 5.0    LDL Calculated 11/15/2024 50  <=99 mg/dL Final                                Near   Borderline      AGE      Desirable  Optimal    High     High     Very High     0-19 Y     0 - 109     ---    110-129   >/= 130     ----    20-24 Y     0 - 119     ---    120-159   >/= 160     ----      >24 Y     0 -  99   100-129  130-159   160-189     >/=190      VLDL 11/15/2024 39  0 - 40 mg/dL Final    Triglycerides 11/15/2024 193 (H)  0 - 149 mg/dL Final    Age              Desirable        Borderline         High        Very High  SEX:B           mg/dL             mg/dL               mg/dL      mg/dL  <=14D                       ----               ----        ----  15D-365D                    ----               ----        ----  1Y-9Y           0-74               75-99             >=100       ----  10Y-19Y        0-89                            >=130       ----  20Y-24Y        0-114             115-149             >=150      ----  >= 25Y         0-149             150-199             200-499    >=500      Venipuncture immediately after or during the administration of Metamizole may lead to falsely low results. Testing should be performed immediately prior to Metamizole dosing.    Non HDL Cholesterol 11/15/2024 89  0 - 149 mg/dL Final          Age       Desirable   Borderline High   High     Very High     0-19 Y     0 - 119       120 - 144     >/= 145    >/= 160    20-24 Y     0 - 149       150 - 189     >/= 190      ----         >24 Y    30 mg/dL above LDL Cholesterol goal      Thyroid Stimulating Hormone 11/15/2024 1.83  0.44 - 3.98 mIU/L Final    Vitamin D, 25-Hydroxy, Total 11/15/2024 16 (L)  30 - 100 ng/mL Final    Hemoglobin A1C 11/15/2024 6.6 (H)  See comment % Final    Estimated Average Glucose  "11/15/2024 143  Not Established mg/dL Final     Current Outpatient Medications on File Prior to Visit   Medication Sig Dispense Refill    cholecalciferol (Vitamin D3) 50 MCG (2000 UT) tablet Take 1 tablet (2,000 Units) by mouth once daily.      clopidogrel (Plavix) 75 mg tablet Take 1 tablet (75 mg) by mouth once daily. 90 tablet 1    DULoxetine (Cymbalta) 60 mg DR capsule Take 1 capsule (60 mg) by mouth once daily. Do not crush or chew. 90 capsule 1    FreeStyle Tyshawn 2 [place] misc USE as directed TO monitor blood glucose with Tyshawn Sensor      FreeStyle Tyshawn 2 Sensor kit USE as directed TO monitor blood glucose. replace every 14 DAYS.      glucose 4 gram chewable tablet Chew 4 tablets (16 g) if needed.      insulin glargine (Lantus Solostar U-100 Insulin) 100 unit/mL (3 mL) pen Inject 12 Units under the skin once daily at bedtime. 30 mL 1    lisinopril 40 mg tablet Take 1 tablet (40 mg) by mouth once daily. 90 tablet 3    LORazepam (Ativan) 0.5 mg tablet Take 1 to 2 tablets (0.5mg to 1mg) 30 minutes prior to flight 4 tablet 0    meclizine (Antivert) 25 mg tablet Take 0.5 tablets (12.5 mg) by mouth 3 times a day as needed for dizziness. 90 tablet 1    metFORMIN (Glucophage) 500 mg tablet Take 2 tablets (1,000 mg) by mouth 2 times a day. 360 tablet 3    oxybutynin XL (Ditropan-XL) 10 mg 24 hr tablet Take 1 tablet (10 mg) by mouth once daily at bedtime. Do not crush, chew, or split. 90 tablet 1    pen needle, diabetic (BD Conchita 2nd Gen Pen Needle) 32 gauge x 5/32\" needle Use one new needle once daily with Lantus 100 each 3    [DISCONTINUED] metoclopramide (Reglan) 5 mg tablet Take 1 tablet (5 mg) by mouth 4 times a day. 360 tablet 1    aspirin 81 mg EC tablet Take 1 tablet (81 mg) by mouth once daily. 30 tablet 11    atorvastatin (Lipitor) 40 mg tablet Take 1 tablet (40 mg) by mouth once daily at bedtime. 90 tablet 1    buPROPion XL (Wellbutrin XL) 150 mg 24 hr tablet Take 1 tablet (150 mg) by mouth once daily in the " morning. Do not crush, chew, or split. 30 tablet 5    buPROPion XL (Wellbutrin XL) 300 mg 24 hr tablet Take 1 tablet (300 mg) by mouth once daily. Do not crush, chew, or split. 90 tablet 1    [DISCONTINUED] buPROPion SR (Wellbutrin SR) 150 mg 12 hr tablet Take 1 tablet (150 mg) by mouth once daily. Do not crush, chew, or split. Take with 300 mg tablet for total dose 450 mg 30 tablet 5     No current facility-administered medications on file prior to visit.     No images are attached to the encounter.            Assessment/Plan   Diagnoses and all orders for this visit:  Type 2 diabetes mellitus with other circulatory complication, with long-term current use of insulin  -     Comprehensive metabolic panel; Future  -     Lipid panel; Future  -     Hemoglobin A1c; Future  Primary hypertension  -     Comprehensive metabolic panel; Future  -     Lipid panel; Future  -     Hemoglobin A1c; Future    1.  Patient to have additional blood work performed  2.  Patient to call for questions or concerns

## 2025-03-28 ENCOUNTER — APPOINTMENT (OUTPATIENT)
Dept: PHARMACY | Facility: HOSPITAL | Age: 70
End: 2025-03-28
Payer: MEDICARE

## 2025-03-28 ENCOUNTER — PATIENT OUTREACH (OUTPATIENT)
Dept: PRIMARY CARE | Facility: CLINIC | Age: 70
End: 2025-03-28

## 2025-03-28 DIAGNOSIS — I10 PRIMARY HYPERTENSION: ICD-10-CM

## 2025-03-28 DIAGNOSIS — Z79.4 TYPE 2 DIABETES MELLITUS WITH OTHER CIRCULATORY COMPLICATION, WITH LONG-TERM CURRENT USE OF INSULIN: ICD-10-CM

## 2025-03-28 DIAGNOSIS — E11.59 TYPE 2 DIABETES MELLITUS WITH OTHER CIRCULATORY COMPLICATION, WITH LONG-TERM CURRENT USE OF INSULIN: ICD-10-CM

## 2025-03-28 NOTE — PROGRESS NOTES
Call received from patient's  asking for the phone number to call and reschedule the appt with the pharmacist for today. I did give them that number to call.     Patient has what started as a some sinus headaches and now is more sort throat and coughing. She has been taking Tylenol to help with symptoms as her  said that is all she can take. She will also do salt water gargles for the sore throat. He said he tried to get her to come in the office for an appt but she refused. I suggested she does come in early next week if still not feeling any better so we can assess her. We also have a provider working on Saturday if needed. I reminded them to call the main office if they need that as no one monitors my line over the weekend.

## 2025-04-03 DIAGNOSIS — F32.9 REACTIVE DEPRESSION: ICD-10-CM

## 2025-04-03 RX ORDER — DULOXETIN HYDROCHLORIDE 60 MG/1
60 CAPSULE, DELAYED RELEASE ORAL DAILY
Qty: 90 CAPSULE | Refills: 0 | Status: SHIPPED | OUTPATIENT
Start: 2025-04-03

## 2025-04-04 ENCOUNTER — APPOINTMENT (OUTPATIENT)
Dept: PHARMACY | Facility: HOSPITAL | Age: 70
End: 2025-04-04
Payer: MEDICARE

## 2025-04-04 DIAGNOSIS — Z79.4 TYPE 2 DIABETES MELLITUS WITH OTHER CIRCULATORY COMPLICATION, WITH LONG-TERM CURRENT USE OF INSULIN: ICD-10-CM

## 2025-04-04 DIAGNOSIS — R32 URINARY INCONTINENCE, UNSPECIFIED TYPE: ICD-10-CM

## 2025-04-04 DIAGNOSIS — E11.59 TYPE 2 DIABETES MELLITUS WITH OTHER CIRCULATORY COMPLICATION, WITH LONG-TERM CURRENT USE OF INSULIN: ICD-10-CM

## 2025-04-04 NOTE — PROGRESS NOTES
Subjective   Patient ID: Glenna Delgado is a 69 y.o. female who presents for Urinary Incontinence and Diabetes.    Referring Provider: Marleen Llanes,     Diabetes  She presents for her follow-up diabetic visit. She has type 2 diabetes mellitus. Her disease course has been stable.   Spoke to patient's  who stated that patient has been doing well with the Freestyle Tyshawn CGM.     From the last visit with the pharmacist, patient was educated on the types of food she should try to eat before bed including protein and complex carbs. Patient has not been consistent with this diet but they are working towards it.     Review of Systems    Objective     There were no vitals taken for this visit.     Labs  Lab Results   Component Value Date    BILITOT 0.4 11/15/2024    CALCIUM 9.4 11/15/2024    CO2 24 11/15/2024    CL 97 (L) 11/15/2024    CREATININE 0.78 11/15/2024    GLUCOSE 219 (H) 11/15/2024    ALKPHOS 85 11/15/2024    K 4.1 11/15/2024    PROT 6.7 11/15/2024     11/15/2024    AST 14 11/15/2024    ALT 15 11/15/2024    BUN 11 11/15/2024    ANIONGAP 20 11/15/2024    ALBUMIN 3.9 11/15/2024     Lab Results   Component Value Date    TRIG 193 (H) 11/15/2024    CHOL 133 11/15/2024    LDLCALC 50 11/15/2024    HDL 44.0 11/15/2024     Lab Results   Component Value Date    HGBA1C 6.6 (H) 11/15/2024       Current Outpatient Medications on File Prior to Visit   Medication Sig Dispense Refill    aspirin 81 mg EC tablet Take 1 tablet (81 mg) by mouth once daily. 30 tablet 11    atorvastatin (Lipitor) 40 mg tablet Take 1 tablet (40 mg) by mouth once daily at bedtime. 90 tablet 1    buPROPion XL (Wellbutrin XL) 150 mg 24 hr tablet Take 1 tablet (150 mg) by mouth once daily in the morning. Do not crush, chew, or split. 30 tablet 5    buPROPion XL (Wellbutrin XL) 300 mg 24 hr tablet Take 1 tablet (300 mg) by mouth once daily. Do not crush, chew, or split. 90 tablet 1    cholecalciferol (Vitamin D3) 50 MCG (2000 UT) tablet  "Take 1 tablet (2,000 Units) by mouth once daily.      clopidogrel (Plavix) 75 mg tablet Take 1 tablet (75 mg) by mouth once daily. 90 tablet 1    DULoxetine (Cymbalta) 60 mg DR capsule TAKE ONE CAPSULE BY MOUTH ONCE DAILY. DO NOT CRUSH OR CHEW. 90 capsule 0    FreeStyle Tyshawn 2 [place] misc USE as directed TO monitor blood glucose with Tyshawn Sensor      FreeStyle Tyshawn 2 Sensor kit USE as directed TO monitor blood glucose. replace every 14 DAYS.      glucose 4 gram chewable tablet Chew 4 tablets (16 g) if needed.      insulin glargine (Lantus Solostar U-100 Insulin) 100 unit/mL (3 mL) pen Inject 12 Units under the skin once daily at bedtime. 30 mL 1    lisinopril 40 mg tablet Take 1 tablet (40 mg) by mouth once daily. 90 tablet 3    LORazepam (Ativan) 0.5 mg tablet Take 1 to 2 tablets (0.5mg to 1mg) 30 minutes prior to flight 4 tablet 0    meclizine (Antivert) 25 mg tablet Take 0.5 tablets (12.5 mg) by mouth 3 times a day as needed for dizziness. 90 tablet 1    metFORMIN (Glucophage) 500 mg tablet Take 2 tablets (1,000 mg) by mouth 2 times a day. 360 tablet 3    oxybutynin XL (Ditropan-XL) 10 mg 24 hr tablet Take 1 tablet (10 mg) by mouth once daily at bedtime. Do not crush, chew, or split. 90 tablet 1    pen needle, diabetic (BD Conchita 2nd Gen Pen Needle) 32 gauge x 5/32\" needle Use one new needle once daily with Lantus 100 each 3    [DISCONTINUED] DULoxetine (Cymbalta) 60 mg DR capsule Take 1 capsule (60 mg) by mouth once daily. Do not crush or chew. 90 capsule 1     No current facility-administered medications on file prior to visit.        Assessment/Plan   Problem List Items Addressed This Visit             ICD-10-CM    Type 2 diabetes mellitus E11.9    Urinary incontinence R32     ASSESSMENT:    Current Pharmacotherapy:  Lantus 14 units daily  Metformin 1000 mg BID    A1c well-controlled at 6.6% Reporting less post meal hyperglycemia > 200.     7 day avg 164  14 day avg 160  30 day avg 140     Last 7 days:  TIR " 84%  Above: 16%  Below: 0%    Last 14 days:  TIR : 86%  Above: 14%  Below: 0%    Counseling provided:  - Reviewed TIR goal of 70%  - Reviewed correlation of carb content in meals with postprandial spikes > 200, advised to continue to be conscious of dietary choices  - A1c check due mid-May      PLAN:  Continue:  Lantus 14 units daily  Metformin 1000 mg BID    Experiencing some idiopathic sedation   OK to change to Gemtesa to reduce risk of side effects   Counseled on administration and side effect mgmt   Patient to drop off  [employer] Prescription Assistance Program information at PCP office   Will also look to get Lantus through [facility] Prescription Assistance Program      PCP Follow Up: 5/2/25  Clinical Pharmacy Follow Up: 6/3/25 @ 3:00     Patient Assistance Screening (VAF)    Patient verbally reports monthly or yearly income which is less than 400% federal poverty level     Application for program has been submitted for the following medications: Gemtesa, insulin    Patient has been informed that program team will be reaching out to them to discuss necessary documentation, instructed to answer phone/return voicemail.     Patient aware this process may take up to 6 weeks.     If approved medication must be filled through [facility] pharmacy and may be picked up or mailed to patient.       Luis Alberto Tsai RPh    Continue all meds under the continuation of care with the referring provider and clinical pharmacy team.

## 2025-04-17 ENCOUNTER — PATIENT OUTREACH (OUTPATIENT)
Dept: PRIMARY CARE | Facility: CLINIC | Age: 70
End: 2025-04-17
Payer: MEDICARE

## 2025-04-17 DIAGNOSIS — R32 URINARY INCONTINENCE, UNSPECIFIED TYPE: ICD-10-CM

## 2025-04-17 DIAGNOSIS — E11.59 TYPE 2 DIABETES MELLITUS WITH OTHER CIRCULATORY COMPLICATION, WITH LONG-TERM CURRENT USE OF INSULIN: ICD-10-CM

## 2025-04-17 DIAGNOSIS — Z79.4 TYPE 2 DIABETES MELLITUS WITH OTHER CIRCULATORY COMPLICATION, WITH LONG-TERM CURRENT USE OF INSULIN: ICD-10-CM

## 2025-04-17 NOTE — PROGRESS NOTES
Call received from patient's . He was having trouble refilling a script for patient's Lantus. He said the refill on it was cancelled. I did a chart review and I see that [facility] has been working on getting patient set up with patient assistance for Lantus and Gemtasa. It says on my end that the first fill was cancelled. So I called [facility] and found out that patient isnt approved yet for PAP. It was only just sent out on 4/15. So usually it takes a week or so. I let Alessandro know this and I will check back on it next week. Patient is down to her last pen and at 14 units a day, this should last her 21 days. So there is some time.

## 2025-04-22 PROCEDURE — RXMED WILLOW AMBULATORY MEDICATION CHARGE

## 2025-04-24 LAB
ALBUMIN SERPL-MCNC: 3.6 G/DL (ref 3.6–5.1)
ALP SERPL-CCNC: 82 U/L (ref 37–153)
ALT SERPL-CCNC: 20 U/L (ref 6–29)
ANION GAP SERPL CALCULATED.4IONS-SCNC: 11 MMOL/L (CALC) (ref 7–17)
AST SERPL-CCNC: 17 U/L (ref 10–35)
BILIRUB SERPL-MCNC: 0.3 MG/DL (ref 0.2–1.2)
BUN SERPL-MCNC: 11 MG/DL (ref 7–25)
CALCIUM SERPL-MCNC: 9.5 MG/DL (ref 8.6–10.4)
CHLORIDE SERPL-SCNC: 100 MMOL/L (ref 98–110)
CHOLEST SERPL-MCNC: 151 MG/DL
CHOLEST/HDLC SERPL: 3.4 (CALC)
CO2 SERPL-SCNC: 26 MMOL/L (ref 20–32)
CREAT SERPL-MCNC: 0.79 MG/DL (ref 0.5–1.05)
EGFRCR SERPLBLD CKD-EPI 2021: 81 ML/MIN/1.73M2
EST. AVERAGE GLUCOSE BLD GHB EST-MCNC: 154 MG/DL
EST. AVERAGE GLUCOSE BLD GHB EST-SCNC: 8.5 MMOL/L
GLUCOSE SERPL-MCNC: 181 MG/DL (ref 65–139)
HBA1C MFR BLD: 7 %
HDLC SERPL-MCNC: 45 MG/DL
LDLC SERPL CALC-MCNC: 81 MG/DL (CALC)
NONHDLC SERPL-MCNC: 106 MG/DL (CALC)
POTASSIUM SERPL-SCNC: 4.4 MMOL/L (ref 3.5–5.3)
PROT SERPL-MCNC: 6.2 G/DL (ref 6.1–8.1)
SODIUM SERPL-SCNC: 137 MMOL/L (ref 135–146)
TRIGL SERPL-MCNC: 157 MG/DL

## 2025-04-25 ENCOUNTER — PATIENT OUTREACH (OUTPATIENT)
Dept: PRIMARY CARE | Facility: CLINIC | Age: 70
End: 2025-04-25
Payer: MEDICARE

## 2025-04-25 DIAGNOSIS — E11.59 TYPE 2 DIABETES MELLITUS WITH OTHER CIRCULATORY COMPLICATION, WITH LONG-TERM CURRENT USE OF INSULIN: ICD-10-CM

## 2025-04-25 DIAGNOSIS — R32 URINARY INCONTINENCE, UNSPECIFIED TYPE: ICD-10-CM

## 2025-04-25 DIAGNOSIS — R13.14 PHARYNGOESOPHAGEAL DYSPHAGIA: ICD-10-CM

## 2025-04-25 DIAGNOSIS — Z79.4 TYPE 2 DIABETES MELLITUS WITH OTHER CIRCULATORY COMPLICATION, WITH LONG-TERM CURRENT USE OF INSULIN: ICD-10-CM

## 2025-04-25 RX ORDER — METOCLOPRAMIDE 5 MG/1
5 TABLET ORAL 4 TIMES DAILY
Qty: 360 TABLET | Refills: 1 | Status: SHIPPED | OUTPATIENT
Start: 2025-04-25

## 2025-04-25 NOTE — PROGRESS NOTES
"I spoke to  Alessandro as he called in for a refill on patient's Reglan. This was removed from the med list from Dr. Llanes back on 3.18 as a \"med list cleanup\" so perhaps they thought she stopped taking it? But Alessandro states no, she has been taking it and does not recall being told to stop it. So I fear it may have been removed in error. I am pending this to PCP and if she intended to have patient be off it, then I will let Alessandro know.     We also discussed the medications from  [facility]. It looked to me like patient has been approved for PAP but they have tried to call twice, on 4/22 and 4/24 without any answer. Alessandro states he has not received any calls. So I called [facility] and talked to them. I gave them the cell number to please try. I was told after 3 attempts they will cancel the order. So I called Alessandro back and left [facility]'s number on the voicemail so he can call them.     "

## 2025-04-26 PROCEDURE — RXMED WILLOW AMBULATORY MEDICATION CHARGE

## 2025-04-28 ENCOUNTER — PHARMACY VISIT (OUTPATIENT)
Dept: PHARMACY | Facility: CLINIC | Age: 70
End: 2025-04-28
Payer: MEDICARE

## 2025-04-29 ENCOUNTER — PATIENT OUTREACH (OUTPATIENT)
Dept: PRIMARY CARE | Facility: CLINIC | Age: 70
End: 2025-04-29
Payer: MEDICARE

## 2025-04-29 DIAGNOSIS — E11.59 TYPE 2 DIABETES MELLITUS WITH OTHER CIRCULATORY COMPLICATION, WITH LONG-TERM CURRENT USE OF INSULIN: ICD-10-CM

## 2025-04-29 DIAGNOSIS — R32 URINARY INCONTINENCE, UNSPECIFIED TYPE: ICD-10-CM

## 2025-04-29 DIAGNOSIS — Z79.4 TYPE 2 DIABETES MELLITUS WITH OTHER CIRCULATORY COMPLICATION, WITH LONG-TERM CURRENT USE OF INSULIN: ICD-10-CM

## 2025-04-29 NOTE — PROGRESS NOTES
Call received from patient's . He received patient's Lantus and Gemtesa from [facility]. He asked to verify what the Gemtesa was taking the place of. This would be the oxybutynin. Per Luis Alberto, patient could start the new medication and keep the oxybutynin in case we need to go back to it. We also discussed that the contents of the cooler/refrigerator system the Lantus comes in can just be re-used for another purpose or thrown away.

## 2025-05-01 DIAGNOSIS — I63.9 CEREBROVASCULAR ACCIDENT (CVA), UNSPECIFIED MECHANISM (MULTI): ICD-10-CM

## 2025-05-01 RX ORDER — CLOPIDOGREL BISULFATE 75 MG/1
TABLET ORAL
Qty: 90 TABLET | Refills: 0 | Status: SHIPPED | OUTPATIENT
Start: 2025-05-01

## 2025-05-02 ENCOUNTER — APPOINTMENT (OUTPATIENT)
Dept: PRIMARY CARE | Facility: CLINIC | Age: 70
End: 2025-05-02
Payer: MEDICARE

## 2025-05-02 VITALS — DIASTOLIC BLOOD PRESSURE: 78 MMHG | HEART RATE: 68 BPM | SYSTOLIC BLOOD PRESSURE: 134 MMHG

## 2025-05-02 DIAGNOSIS — Z79.4 TYPE 2 DIABETES MELLITUS WITH OTHER CIRCULATORY COMPLICATION, WITH LONG-TERM CURRENT USE OF INSULIN: Primary | ICD-10-CM

## 2025-05-02 DIAGNOSIS — E11.59 TYPE 2 DIABETES MELLITUS WITH OTHER CIRCULATORY COMPLICATION, WITH LONG-TERM CURRENT USE OF INSULIN: Primary | ICD-10-CM

## 2025-05-02 DIAGNOSIS — G47.33 OBSTRUCTIVE SLEEP APNEA SYNDROME: ICD-10-CM

## 2025-05-02 DIAGNOSIS — I10 PRIMARY HYPERTENSION: ICD-10-CM

## 2025-05-02 DIAGNOSIS — H61.23 BILATERAL IMPACTED CERUMEN: ICD-10-CM

## 2025-05-02 PROCEDURE — 4010F ACE/ARB THERAPY RXD/TAKEN: CPT | Performed by: FAMILY MEDICINE

## 2025-05-02 PROCEDURE — 1160F RVW MEDS BY RX/DR IN RCRD: CPT | Performed by: FAMILY MEDICINE

## 2025-05-02 PROCEDURE — 1123F ACP DISCUSS/DSCN MKR DOCD: CPT | Performed by: FAMILY MEDICINE

## 2025-05-02 PROCEDURE — 3078F DIAST BP <80 MM HG: CPT | Performed by: FAMILY MEDICINE

## 2025-05-02 PROCEDURE — 1157F ADVNC CARE PLAN IN RCRD: CPT | Performed by: FAMILY MEDICINE

## 2025-05-02 PROCEDURE — 1036F TOBACCO NON-USER: CPT | Performed by: FAMILY MEDICINE

## 2025-05-02 PROCEDURE — 99214 OFFICE O/P EST MOD 30 MIN: CPT | Performed by: FAMILY MEDICINE

## 2025-05-02 PROCEDURE — 1159F MED LIST DOCD IN RCRD: CPT | Performed by: FAMILY MEDICINE

## 2025-05-02 PROCEDURE — 3075F SYST BP GE 130 - 139MM HG: CPT | Performed by: FAMILY MEDICINE

## 2025-05-02 ASSESSMENT — PATIENT HEALTH QUESTIONNAIRE - PHQ9
SUM OF ALL RESPONSES TO PHQ9 QUESTIONS 1 AND 2: 0
2. FEELING DOWN, DEPRESSED OR HOPELESS: NOT AT ALL
1. LITTLE INTEREST OR PLEASURE IN DOING THINGS: NOT AT ALL
10. IF YOU CHECKED OFF ANY PROBLEMS, HOW DIFFICULT HAVE THESE PROBLEMS MADE IT FOR YOU TO DO YOUR WORK, TAKE CARE OF THINGS AT HOME, OR GET ALONG WITH OTHER PEOPLE: NOT DIFFICULT AT ALL

## 2025-05-02 ASSESSMENT — ENCOUNTER SYMPTOMS
APPETITE CHANGE: 0
COUGH: 0
OCCASIONAL FEELINGS OF UNSTEADINESS: 0
HEADACHES: 0
DIZZINESS: 0
FATIGUE: 0
ARTHRALGIAS: 0
LIGHT-HEADEDNESS: 0
PALPITATIONS: 0
BACK PAIN: 0
CHOKING: 0
COLOR CHANGE: 0
CHEST TIGHTNESS: 0
DEPRESSION: 0
ACTIVITY CHANGE: 0
FEVER: 0
FACIAL ASYMMETRY: 0
LOSS OF SENSATION IN FEET: 0

## 2025-05-02 NOTE — PROGRESS NOTES
"Subjective   Patient ID: Glenna Delgado is a 69 y.o. female who presents for Follow up from Neurology .    HPI   Patient is here to follow-up from neurology.  She is also here to discuss her blood sugars.  Most recent hemoglobin A1c that she had performed was 7. Since her medication has been changed she is not getting any low blood sugars. She has had several \"spikes\" in her blood sugar. Last night she got a sugar 248 about 2 hours after she ate dinner. She thinks she had a bean burrito. She did have a reguar pepsi.     Review of Systems   Constitutional:  Negative for activity change, appetite change, fatigue and fever.   HENT:  Negative for congestion, ear discharge and ear pain.    Respiratory:  Negative for cough, choking and chest tightness.    Cardiovascular:  Negative for chest pain, palpitations and leg swelling.   Musculoskeletal:  Negative for arthralgias, back pain and gait problem.   Skin:  Negative for color change and pallor.   Neurological:  Negative for dizziness, facial asymmetry, light-headedness and headaches.       Objective   /78 (BP Location: Left arm, Patient Position: Sitting)   Pulse 68   BSA There is no height or weight on file to calculate BSA.      Physical Exam  Constitutional:       General: She is not in acute distress.     Appearance: Normal appearance. She is not toxic-appearing.   HENT:      Head: Normocephalic.      Right Ear: There is impacted cerumen.      Left Ear: There is impacted cerumen.      Ears:      Comments: Bilateral cerumen impaction appreciated a cerumen spoon was utilized to attempt to remove wax this was unsuccessful and an ear irrigation was performed until visualized clear bilaterally to tympanic membranes with otoscope  Eyes:      Conjunctiva/sclera: Conjunctivae normal.      Pupils: Pupils are equal, round, and reactive to light.   Cardiovascular:      Rate and Rhythm: Normal rate and regular rhythm.      Pulses: Normal pulses.      Heart sounds: Normal " heart sounds.   Pulmonary:      Effort: No respiratory distress.      Breath sounds: No wheezing, rhonchi or rales.   Musculoskeletal:         General: No swelling or tenderness.   Skin:     Findings: No lesion or rash.   Neurological:      General: No focal deficit present.      Mental Status: She is alert and oriented to person, place, and time. Mental status is at baseline.      Gait: Gait normal.   Psychiatric:         Mood and Affect: Mood normal.         Behavior: Behavior normal.         Thought Content: Thought content normal.         Judgment: Judgment normal.       Office Visit on 03/18/2025   Component Date Value Ref Range Status    GLUCOSE 04/23/2025 181 (H)  65 - 139 mg/dL Final    Comment:           Non-fasting reference interval     For someone without known diabetes, a glucose  value >125 mg/dL indicates that they may have  diabetes and this should be confirmed with a  follow-up test.         UREA NITROGEN (BUN) 04/23/2025 11  7 - 25 mg/dL Final    CREATININE 04/23/2025 0.79  0.50 - 1.05 mg/dL Final    EGFR 04/23/2025 81  > OR = 60 mL/min/1.73m2 Final    SODIUM 04/23/2025 137  135 - 146 mmol/L Final    POTASSIUM 04/23/2025 4.4  3.5 - 5.3 mmol/L Final    CHLORIDE 04/23/2025 100  98 - 110 mmol/L Final    CARBON DIOXIDE 04/23/2025 26  20 - 32 mmol/L Final    ELECTROLYTE BALANCE 04/23/2025 11  7 - 17 mmol/L (calc) Final    CALCIUM 04/23/2025 9.5  8.6 - 10.4 mg/dL Final    PROTEIN, TOTAL 04/23/2025 6.2  6.1 - 8.1 g/dL Final    ALBUMIN 04/23/2025 3.6  3.6 - 5.1 g/dL Final    BILIRUBIN, TOTAL 04/23/2025 0.3  0.2 - 1.2 mg/dL Final    ALKALINE PHOSPHATASE 04/23/2025 82  37 - 153 U/L Final    AST 04/23/2025 17  10 - 35 U/L Final    ALT 04/23/2025 20  6 - 29 U/L Final    CHOLESTEROL, TOTAL 04/23/2025 151  <200 mg/dL Final    HDL CHOLESTEROL 04/23/2025 45 (L)  > OR = 50 mg/dL Final    TRIGLYCERIDES 04/23/2025 157 (H)  <150 mg/dL Final    LDL-CHOLESTEROL 04/23/2025 81  mg/dL (calc) Final    Comment: Reference  range: <100     Desirable range <100 mg/dL for primary prevention;    <70 mg/dL for patients with CHD or diabetic patients   with > or = 2 CHD risk factors.     LDL-C is now calculated using the Benedicto   calculation, which is a validated novel method providing   better accuracy than the Friedewald equation in the   estimation of LDL-C.   Rebel COBURN et al. KAREEM. 2013;310(19): 3812-2455   (http://education.[employer].co[employer]faq/KIH872)      CHOL/HDLC RATIO 04/23/2025 3.4  <5.0 (calc) Final    NON HDL CHOLESTEROL 04/23/2025 106  <130 mg/dL (calc) Final    Comment: For patients with diabetes plus 1 major ASCVD risk   factor, treating to a non-HDL-C goal of <100 mg/dL   (LDL-C of <70 mg/dL) is considered a therapeutic   option.      HEMOGLOBIN A1c 04/23/2025 7.0 (H)  <5.7 % Final    Comment: For someone without known diabetes, a hemoglobin A1c  value of 6.5% or greater indicates that they may have   diabetes and this should be confirmed with a follow-up   test.     For someone with known diabetes, a value <7% indicates   that their diabetes is well controlled and a value   greater than or equal to 7% indicates suboptimal   control. A1c targets should be individualized based on   duration of diabetes, age, comorbid conditions, and   other considerations.     Currently, no consensus exists regarding use of  hemoglobin A1c for diagnosis of diabetes for children.          eAG (mg/dL) 04/23/2025 154  mg/dL Final    eAG (mmol/L) 04/23/2025 8.5  mmol/L Final   Lab on 11/15/2024   Component Date Value Ref Range Status    WBC 11/15/2024 8.8  4.4 - 11.3 x10*3/uL Final    nRBC 11/15/2024 0.0  0.0 - 0.0 /100 WBCs Final    RBC 11/15/2024 4.46  4.00 - 5.20 x10*6/uL Final    Hemoglobin 11/15/2024 12.3  12.0 - 16.0 g/dL Final    Hematocrit 11/15/2024 40.3  36.0 - 46.0 % Final    MCV 11/15/2024 90  80 - 100 fL Final    MCH 11/15/2024 27.6  26.0 - 34.0 pg Final    MCHC 11/15/2024 30.5 (L)  32.0 - 36.0 g/dL Final    RDW  11/15/2024 14.5  11.5 - 14.5 % Final    Platelets 11/15/2024 273  150 - 450 x10*3/uL Final    Neutrophils % 11/15/2024 69.3  40.0 - 80.0 % Final    Immature Granulocytes %, Automated 11/15/2024 1.0 (H)  0.0 - 0.9 % Final    Immature Granulocyte Count (IG) includes promyelocytes, myelocytes and metamyelocytes but does not include bands. Percent differential counts (%) should be interpreted in the context of the absolute cell counts (cells/UL).    Lymphocytes % 11/15/2024 21.8  13.0 - 44.0 % Final    Monocytes % 11/15/2024 5.8  2.0 - 10.0 % Final    Eosinophils % 11/15/2024 1.4  0.0 - 6.0 % Final    Basophils % 11/15/2024 0.7  0.0 - 2.0 % Final    Neutrophils Absolute 11/15/2024 6.07  1.20 - 7.70 x10*3/uL Final    Percent differential counts (%) should be interpreted in the context of the absolute cell counts (cells/uL).    Immature Granulocytes Absolute, Au* 11/15/2024 0.09  0.00 - 0.70 x10*3/uL Final    Lymphocytes Absolute 11/15/2024 1.91  1.20 - 4.80 x10*3/uL Final    Monocytes Absolute 11/15/2024 0.51  0.10 - 1.00 x10*3/uL Final    Eosinophils Absolute 11/15/2024 0.12  0.00 - 0.70 x10*3/uL Final    Basophils Absolute 11/15/2024 0.06  0.00 - 0.10 x10*3/uL Final    Glucose 11/15/2024 219 (H)  74 - 99 mg/dL Final    Sodium 11/15/2024 137  136 - 145 mmol/L Final    Potassium 11/15/2024 4.1  3.5 - 5.3 mmol/L Final    Chloride 11/15/2024 97 (L)  98 - 107 mmol/L Final    Bicarbonate 11/15/2024 24  21 - 32 mmol/L Final    Anion Gap 11/15/2024 20  10 - 20 mmol/L Final    Urea Nitrogen 11/15/2024 11  6 - 23 mg/dL Final    Creatinine 11/15/2024 0.78  0.50 - 1.05 mg/dL Final    eGFR 11/15/2024 82  >60 mL/min/1.73m*2 Final    Calculations of estimated GFR are performed using the 2021 CKD-EPI Study Refit equation without the race variable for the IDMS-Traceable creatinine methods.  https://jasn.asnjournals.org/content/early/2021/09/22/ASN.3425427970    Calcium 11/15/2024 9.4  8.6 - 10.6 mg/dL Final    Albumin 11/15/2024 3.9   3.4 - 5.0 g/dL Final    Alkaline Phosphatase 11/15/2024 85  33 - 136 U/L Final    Total Protein 11/15/2024 6.7  6.4 - 8.2 g/dL Final    AST 11/15/2024 14  9 - 39 U/L Final    Bilirubin, Total 11/15/2024 0.4  0.0 - 1.2 mg/dL Final    ALT 11/15/2024 15  7 - 45 U/L Final    Patients treated with Sulfasalazine may generate falsely decreased results for ALT.    Cholesterol 11/15/2024 133  0 - 199 mg/dL Final          Age      Desirable   Borderline High   High     0-19 Y     0 - 169       170 - 199     >/= 200    20-24 Y     0 - 189       190 - 224     >/= 225         >24 Y     0 - 199       200 - 239     >/= 240   **All ranges are based on fasting samples. Specific   therapeutic targets will vary based on patient-specific   cardiac risk.    Pediatric guidelines reference:Pediatrics 2011, 128(S5).Adult guidelines reference: NCEP ATPIII Guidelines,KAREEM 2001, 258:2486-97    Venipuncture immediately after or during the administration of Metamizole may lead to falsely low results. Testing should be performed immediately prior to Metamizole dosing.    HDL-Cholesterol 11/15/2024 44.0  mg/dL Final      Age       Very Low   Low     Normal    High    0-19 Y    < 35      < 40     40-45     ----  20-24 Y    ----     < 40      >45      ----        >24 Y      ----     < 40     40-60      >60      Cholesterol/HDL Ratio 11/15/2024 3.0   Final      Ref Values  Desirable  < 3.4  High Risk  > 5.0    LDL Calculated 11/15/2024 50  <=99 mg/dL Final                                Near   Borderline      AGE      Desirable  Optimal    High     High     Very High     0-19 Y     0 - 109     ---    110-129   >/= 130     ----    20-24 Y     0 - 119     ---    120-159   >/= 160     ----      >24 Y     0 -  99   100-129  130-159   160-189     >/=190      VLDL 11/15/2024 39  0 - 40 mg/dL Final    Triglycerides 11/15/2024 193 (H)  0 - 149 mg/dL Final    Age              Desirable        Borderline         High        Very High  SEX:B           mg/dL              mg/dL               mg/dL      mg/dL  <=14D                       ----               ----        ----  15D-365D                    ----               ----        ----  1Y-9Y           0-74               75-99             >=100       ----  10Y-19Y        0-89                            >=130       ----  20Y-24Y        0-114             115-149             >=150      ----  >= 25Y         0-149             150-199             200-499    >=500      Venipuncture immediately after or during the administration of Metamizole may lead to falsely low results. Testing should be performed immediately prior to Metamizole dosing.    Non HDL Cholesterol 11/15/2024 89  0 - 149 mg/dL Final          Age       Desirable   Borderline High   High     Very High     0-19 Y     0 - 119       120 - 144     >/= 145    >/= 160    20-24 Y     0 - 149       150 - 189     >/= 190      ----         >24 Y    30 mg/dL above LDL Cholesterol goal      Thyroid Stimulating Hormone 11/15/2024 1.83  0.44 - 3.98 mIU/L Final    Vitamin D, 25-Hydroxy, Total 11/15/2024 16 (L)  30 - 100 ng/mL Final    Hemoglobin A1C 11/15/2024 6.6 (H)  See comment % Final    Estimated Average Glucose 11/15/2024 143  Not Established mg/dL Final     Medications Ordered Prior to Encounter[1]  No images are attached to the encounter.            Assessment/Plan   Diagnoses and all orders for this visit:  Type 2 diabetes mellitus with other circulatory complication, with long-term current use of insulin  Obstructive sleep apnea syndrome  Primary hypertension  Bilateral impacted cerumen  -     Ear Cerumen Removal; Future    Patient to continue to monitor her blood sugars  Patient to continue with her cpap  Patient to call if questions or concerns            [1]   Current Outpatient Medications on File Prior to Visit   Medication Sig Dispense Refill    aspirin 81 mg EC tablet Take 1 tablet (81 mg) by mouth once daily. 30 tablet 11    atorvastatin (Lipitor)  "40 mg tablet Take 1 tablet (40 mg) by mouth once daily at bedtime. 90 tablet 1    buPROPion XL (Wellbutrin XL) 300 mg 24 hr tablet Take 1 tablet (300 mg) by mouth once daily. Do not crush, chew, or split. 90 tablet 1    cholecalciferol (Vitamin D3) 50 MCG (2000 UT) tablet Take 1 tablet (2,000 Units) by mouth once daily.      clopidogrel (Plavix) 75 mg tablet TAKE ONE TABLET (75 MG) BY MOUTH ONCE DAILY 90 tablet 0    DULoxetine (Cymbalta) 60 mg DR capsule TAKE ONE CAPSULE BY MOUTH ONCE DAILY. DO NOT CRUSH OR CHEW. 90 capsule 0    FreeStyle Tyshawn 2 [place] misc USE as directed TO monitor blood glucose with Tyshawn Sensor      FreeStyle Tyshawn 2 Sensor kit USE as directed TO monitor blood glucose. replace every 14 DAYS.      glucose 4 gram chewable tablet Chew 4 tablets (16 g) if needed.      insulin glargine (Lantus Solostar U-100 Insulin) 100 unit/mL (3 mL) pen Inject 14 Units under the skin once daily at bedtime. 15 mL 11    lisinopril 40 mg tablet Take 1 tablet (40 mg) by mouth once daily. 90 tablet 3    LORazepam (Ativan) 0.5 mg tablet Take 1 to 2 tablets (0.5mg to 1mg) 30 minutes prior to flight 4 tablet 0    meclizine (Antivert) 25 mg tablet Take 0.5 tablets (12.5 mg) by mouth 3 times a day as needed for dizziness. 90 tablet 1    metFORMIN (Glucophage) 500 mg tablet Take 2 tablets (1,000 mg) by mouth 2 times a day. 360 tablet 3    metoclopramide (Reglan) 5 mg tablet Take 1 tablet (5 mg) by mouth 4 times a day. 360 tablet 1    pen needle, diabetic (BD Conchita 2nd Gen Pen Needle) 32 gauge x 5/32\" needle Use one new needle once daily with Lantus 100 each 3    vibegron (Gemtesa) 75 mg tablet Take 1 tablet (75 mg) by mouth once daily. 30 tablet 11    buPROPion XL (Wellbutrin XL) 150 mg 24 hr tablet Take 1 tablet (150 mg) by mouth once daily in the morning. Do not crush, chew, or split. 30 tablet 5    [DISCONTINUED] clopidogrel (Plavix) 75 mg tablet Take 1 tablet (75 mg) by mouth once daily. 90 tablet 1    [DISCONTINUED] " oxybutynin XL (Ditropan-XL) 10 mg 24 hr tablet Take 1 tablet (10 mg) by mouth once daily at bedtime. Do not crush, chew, or split. (Patient not taking: Reported on 4/29/2025) 90 tablet 1     No current facility-administered medications on file prior to visit.

## 2025-05-28 ENCOUNTER — PATIENT OUTREACH (OUTPATIENT)
Dept: PRIMARY CARE | Facility: CLINIC | Age: 70
End: 2025-05-28
Payer: MEDICARE

## 2025-05-28 DIAGNOSIS — Z79.4 TYPE 2 DIABETES MELLITUS WITH OTHER CIRCULATORY COMPLICATION, WITH LONG-TERM CURRENT USE OF INSULIN: ICD-10-CM

## 2025-05-28 DIAGNOSIS — I63.9 CEREBROVASCULAR ACCIDENT (CVA), UNSPECIFIED MECHANISM (MULTI): ICD-10-CM

## 2025-05-28 DIAGNOSIS — E11.59 TYPE 2 DIABETES MELLITUS WITH OTHER CIRCULATORY COMPLICATION, WITH LONG-TERM CURRENT USE OF INSULIN: ICD-10-CM

## 2025-05-28 NOTE — PROGRESS NOTES
Care Management Monthly Outreach  Chart review completed  Confirmation of at least 2 patient identifiers  Change in insurance? No    Has patient been to ER/Urgent Care since last outreach? No    Last Office Visit with PCP: 5/2/2025   Next Office Visit with PCP: Visit date not found   APC Collaboration: Pharmacy    Chronic Conditions and Outreach Summary:   Cerebrovascular accident (CVA), unspecified mechanism (Multi)    Type 2 diabetes mellitus with other circulatory complication, with long-term current use of insulin    Chart review completed prior to [facility] outreach. Call completed to patient's . Patient overall has been declining cognitively and just not conversing as much. She saw Neurology who told Alessandro this was just going to continue to worsen. They did do an MRI on 5/6/25 but they have not received any results. He called [place] earlier this week and was told they have the results and the NP will have to review and get back to him. He still has not heard anything. I did look this up and read parts of the report to him so he had some idea about it. It does not appear that she has any new areas of stroke. It just seems to show the old stroke. But I explained they still need to get official results from the Neurologist. He states he would let me know when they do finally call.     I reminded him about the telephone visit with Luis Alberto next week.     Medications:   Are there medication changes since last visit? No  Refills needed? No    Social Drivers of Health: Deferred  Care Gaps Addressed? Deferred  Care Plan addressed: Yes    Upcoming Appointments:   Future Appointments       Date / Time Provider Department Dept Phone    6/3/2025 3:00 PM (Arrive by 2:45 PM) Luis Alberto Ts[facility]  Arrive at: [facility] [phone]          Blood Pressures Reviewed  BP Readings from Last 3 Encounters:   05/02/25 134/78   03/18/25 124/74   12/16/24 140/82     Labs Reviewed:  Lab Results    Component Value Date    CREATININE 0.79 04/23/2025    GLUCOSE 181 (H) 04/23/2025    ALKPHOS 82 04/23/2025    K 4.4 04/23/2025    PROT 6.2 04/23/2025     04/23/2025    CALCIUM 9.5 04/23/2025    AST 17 04/23/2025    ALT 20 04/23/2025    BUN 11 04/23/2025     Lab Results   Component Value Date    TRIG 157 (H) 04/23/2025    CHOL 151 04/23/2025    LDLCALC 81 04/23/2025    HDL 45 (L) 04/23/2025     Lab Results   Component Value Date    HGBA1C 7.0 (H) 04/23/2025    HGBA1C 6.6 (H) 11/15/2024    HGBA1C 5.5 07/16/2023     Lab Results   Component Value Date    WBC 8.8 11/15/2024    RBC 4.46 11/15/2024    HGB 12.3 11/15/2024     11/15/2024   No other concerns at this time.  Agreeable to continue monthly outreaches.  Encouraged to call if questions or concerns arise.    Jami Akers, RN   RN Care Manager   [facility]   [phone]

## 2025-06-02 NOTE — PROGRESS NOTES
Subjective   Patient ID: Glenna Delgado is a 69 y.o. female who presents for Diabetes.    Referring Provider: Marleen Llanes,     Diabetes  She presents for her follow-up diabetic visit. She has type 2 diabetes mellitus. Her disease course has been stable.   Spoke to patient's  who stated that patient has been doing well with the Freestyle Ytshawn CGM.     From the last visit with the pharmacist, patient was educated on the types of food she should try to eat before bed including protein and complex carbs. Patient has not been consistent with this diet but they are working towards it.       Review of Systems    Objective     There were no vitals taken for this visit.     Labs  Lab Results   Component Value Date    BILITOT 0.3 04/23/2025    CALCIUM 9.5 04/23/2025    CO2 26 04/23/2025     04/23/2025    CREATININE 0.79 04/23/2025    GLUCOSE 181 (H) 04/23/2025    ALKPHOS 82 04/23/2025    K 4.4 04/23/2025    PROT 6.2 04/23/2025     04/23/2025    AST 17 04/23/2025    ALT 20 04/23/2025    BUN 11 04/23/2025    ANIONGAP 11 04/23/2025    ALBUMIN 3.6 04/23/2025     Lab Results   Component Value Date    TRIG 157 (H) 04/23/2025    CHOL 151 04/23/2025    LDLCALC 81 04/23/2025    HDL 45 (L) 04/23/2025     Lab Results   Component Value Date    HGBA1C 7.0 (H) 04/23/2025       Current Outpatient Medications on File Prior to Visit   Medication Sig Dispense Refill    aspirin 81 mg EC tablet Take 1 tablet (81 mg) by mouth once daily. 30 tablet 11    atorvastatin (Lipitor) 40 mg tablet Take 1 tablet (40 mg) by mouth once daily at bedtime. 90 tablet 1    buPROPion XL (Wellbutrin XL) 150 mg 24 hr tablet Take 1 tablet (150 mg) by mouth once daily in the morning. Do not crush, chew, or split. 30 tablet 5    buPROPion XL (Wellbutrin XL) 300 mg 24 hr tablet Take 1 tablet (300 mg) by mouth once daily. Do not crush, chew, or split. 90 tablet 1    cholecalciferol (Vitamin D3) 50 MCG (2000 UT) tablet Take 1 tablet (2,000 Units)  "by mouth once daily.      clopidogrel (Plavix) 75 mg tablet TAKE ONE TABLET (75 MG) BY MOUTH ONCE DAILY 90 tablet 0    DULoxetine (Cymbalta) 60 mg DR capsule TAKE ONE CAPSULE BY MOUTH ONCE DAILY. DO NOT CRUSH OR CHEW. 90 capsule 0    FreeStyle Tyshawn 2 [place] misc USE as directed TO monitor blood glucose with Tyshawn Sensor      FreeStyle Tyshawn 2 Sensor kit USE as directed TO monitor blood glucose. replace every 14 DAYS.      glucose 4 gram chewable tablet Chew 4 tablets (16 g) if needed.      insulin glargine (Lantus Solostar U-100 Insulin) 100 unit/mL (3 mL) pen Inject 14 Units under the skin once daily at bedtime. 15 mL 11    lisinopril 40 mg tablet Take 1 tablet (40 mg) by mouth once daily. 90 tablet 3    LORazepam (Ativan) 0.5 mg tablet Take 1 to 2 tablets (0.5mg to 1mg) 30 minutes prior to flight 4 tablet 0    meclizine (Antivert) 25 mg tablet Take 0.5 tablets (12.5 mg) by mouth 3 times a day as needed for dizziness. 90 tablet 1    metFORMIN (Glucophage) 500 mg tablet Take 2 tablets (1,000 mg) by mouth 2 times a day. 360 tablet 3    metoclopramide (Reglan) 5 mg tablet Take 1 tablet (5 mg) by mouth 4 times a day. 360 tablet 1    pen needle, diabetic (BD Conchita 2nd Gen Pen Needle) 32 gauge x 5/32\" needle Use one new needle once daily with Lantus 100 each 3    vibegron (Gemtesa) 75 mg tablet Take 1 tablet (75 mg) by mouth once daily. 30 tablet 11     No current facility-administered medications on file prior to visit.        Assessment/Plan   Problem List Items Addressed This Visit           ICD-10-CM    Type 2 diabetes mellitus - Primary E11.9    Relevant Orders    Referral to Clinical Pharmacy       ASSESSMENT:    Current Pharmacotherapy:  Lantus 14 units daily  Metformin 1000 mg BID    A1c is increased and borderline controlled with most recent value of 7% (goal < 7). BG continue to be within goal range most of the time. Reporting less post meal hyperglycemia > 200.     7 day avg 151  14 day avg 152  30 day avg " 149    Lowest value: 89    Counseling provided:  -  PAP approval and renewal process  - Reviewed correlation of carb content in meals with postprandial spikes > 200, advised to continue to be conscious of dietary choices        PLAN:  Continue:  Lantus 14 units daily  Metformin 1000 mg BID      PCP Follow Up: to schedule  Clinical Pharmacy Follow Up: 9/2/2025 @ 3:00      Karen Griffin, PharmD    Continue all meds under the continuation of care with the referring provider and clinical pharmacy team.      Patient Assistance Program    Thank you for applying for medication assistance through the [employer] [place] The program administration has reviewed your application for copay assistance. We are pleased to inform  you that your application for assistance has been approved. This approval is valid through April 22,2026 as long as the following criteria continue to be satisfied:   Your medication (Gemtesa and Lantus) remains covered under your current insurance plan.   Your prescriber does not discontinue therapy.   You do not seek reimbursement from any other private o government-funded programs for the medication.  Under this program, the pharmacy will first bill your insurance plan for your indemnified specified medication. The [employer] Fund will then offset your copay balance, so that your out-ofpocket expense for your specialty medication will be $0.00.

## 2025-06-03 ENCOUNTER — APPOINTMENT (OUTPATIENT)
Dept: PHARMACY | Facility: HOSPITAL | Age: 70
End: 2025-06-03
Payer: MEDICARE

## 2025-06-03 DIAGNOSIS — Z79.4 TYPE 2 DIABETES MELLITUS WITH OTHER CIRCULATORY COMPLICATION, WITH LONG-TERM CURRENT USE OF INSULIN: Primary | ICD-10-CM

## 2025-06-03 DIAGNOSIS — E11.59 TYPE 2 DIABETES MELLITUS WITH OTHER CIRCULATORY COMPLICATION, WITH LONG-TERM CURRENT USE OF INSULIN: Primary | ICD-10-CM

## 2025-06-03 PROCEDURE — RXMED WILLOW AMBULATORY MEDICATION CHARGE

## 2025-06-05 ENCOUNTER — PHARMACY VISIT (OUTPATIENT)
Dept: PHARMACY | Facility: CLINIC | Age: 70
End: 2025-06-05
Payer: MEDICARE

## 2025-06-17 DIAGNOSIS — E11.9 TYPE 2 DIABETES MELLITUS WITHOUT COMPLICATION, WITH LONG-TERM CURRENT USE OF INSULIN: ICD-10-CM

## 2025-06-17 DIAGNOSIS — Z79.4 TYPE 2 DIABETES MELLITUS WITHOUT COMPLICATION, WITH LONG-TERM CURRENT USE OF INSULIN: ICD-10-CM

## 2025-06-18 RX ORDER — ATORVASTATIN CALCIUM 40 MG/1
TABLET, FILM COATED ORAL
Qty: 90 TABLET | Refills: 0 | Status: SHIPPED | OUTPATIENT
Start: 2025-06-18

## 2025-07-07 DIAGNOSIS — F32.9 REACTIVE DEPRESSION: ICD-10-CM

## 2025-07-07 RX ORDER — DULOXETIN HYDROCHLORIDE 60 MG/1
60 CAPSULE, DELAYED RELEASE ORAL DAILY
Qty: 90 CAPSULE | Refills: 0 | Status: SHIPPED | OUTPATIENT
Start: 2025-07-07

## 2025-07-08 DIAGNOSIS — I63.9 CEREBROVASCULAR ACCIDENT (CVA), UNSPECIFIED MECHANISM (MULTI): ICD-10-CM

## 2025-07-08 DIAGNOSIS — Z79.4 TYPE 2 DIABETES MELLITUS WITHOUT COMPLICATION, WITH LONG-TERM CURRENT USE OF INSULIN: ICD-10-CM

## 2025-07-08 DIAGNOSIS — E11.9 TYPE 2 DIABETES MELLITUS WITHOUT COMPLICATION, WITH LONG-TERM CURRENT USE OF INSULIN: ICD-10-CM

## 2025-07-09 RX ORDER — ATORVASTATIN CALCIUM 40 MG/1
40 TABLET, FILM COATED ORAL NIGHTLY
Qty: 90 TABLET | Refills: 0 | Status: SHIPPED | OUTPATIENT
Start: 2025-07-09

## 2025-07-09 RX ORDER — CLOPIDOGREL BISULFATE 75 MG/1
75 TABLET ORAL DAILY
Qty: 90 TABLET | Refills: 0 | Status: SHIPPED | OUTPATIENT
Start: 2025-07-09

## 2025-08-06 PROCEDURE — RXMED WILLOW AMBULATORY MEDICATION CHARGE

## 2025-08-07 ENCOUNTER — PHARMACY VISIT (OUTPATIENT)
Dept: PHARMACY | Facility: CLINIC | Age: 70
End: 2025-08-07
Payer: MEDICARE

## 2025-08-15 ENCOUNTER — PATIENT OUTREACH (OUTPATIENT)
Dept: PRIMARY CARE | Facility: CLINIC | Age: 70
End: 2025-08-15
Payer: MEDICARE

## 2025-08-15 DIAGNOSIS — F33.9 DEPRESSION, RECURRENT: ICD-10-CM

## 2025-08-15 DIAGNOSIS — Z79.4 TYPE 2 DIABETES MELLITUS WITHOUT COMPLICATION, WITH LONG-TERM CURRENT USE OF INSULIN: ICD-10-CM

## 2025-08-15 DIAGNOSIS — Z86.73 HISTORY OF CEREBROVASCULAR ACCIDENT: ICD-10-CM

## 2025-08-15 DIAGNOSIS — E11.9 TYPE 2 DIABETES MELLITUS WITHOUT COMPLICATION, WITH LONG-TERM CURRENT USE OF INSULIN: ICD-10-CM

## 2025-08-27 ENCOUNTER — TELEPHONE (OUTPATIENT)
Dept: PRIMARY CARE | Facility: CLINIC | Age: 70
End: 2025-08-27
Payer: MEDICARE

## 2025-08-27 DIAGNOSIS — Z79.4 TYPE 2 DIABETES MELLITUS WITHOUT COMPLICATION, WITH LONG-TERM CURRENT USE OF INSULIN: ICD-10-CM

## 2025-08-27 DIAGNOSIS — E11.9 TYPE 2 DIABETES MELLITUS WITHOUT COMPLICATION, WITH LONG-TERM CURRENT USE OF INSULIN: ICD-10-CM

## 2025-08-27 RX ORDER — FLASH GLUCOSE SENSOR
KIT MISCELLANEOUS
Qty: 6 EACH | Refills: 1 | Status: SHIPPED | OUTPATIENT
Start: 2025-08-27

## 2025-09-02 ENCOUNTER — APPOINTMENT (OUTPATIENT)
Dept: PHARMACY | Facility: HOSPITAL | Age: 70
End: 2025-09-02
Payer: MEDICARE